# Patient Record
Sex: MALE | Race: WHITE | Employment: FULL TIME | URBAN - METROPOLITAN AREA
[De-identification: names, ages, dates, MRNs, and addresses within clinical notes are randomized per-mention and may not be internally consistent; named-entity substitution may affect disease eponyms.]

---

## 2017-03-22 RX ORDER — TOPIRAMATE 25 MG/1
TABLET ORAL
Qty: 90 TABLET | Refills: 0 | Status: SHIPPED | OUTPATIENT
Start: 2017-03-22 | End: 2017-04-05

## 2017-03-22 RX ORDER — TOPIRAMATE 25 MG/1
TABLET ORAL
Qty: 30 TABLET | Refills: 0 | Status: SHIPPED | OUTPATIENT
Start: 2017-03-22 | End: 2017-03-22

## 2017-03-22 NOTE — TELEPHONE ENCOUNTER
Please call patient. Is he still taking this med? Has he established care with another doctor? If he plans to continue care here, please place Rx for 30 days and patient needs to make appt for fasting labs and then office visit for further refills.

## 2017-03-22 NOTE — TELEPHONE ENCOUNTER
LOV: 12/20/2015   Future office visit: no upcoming visit   Last labs: 5/25/16 Lipid 12/30/15 Cmp   Last RX: 12/22/16 #90 No Refills  Per protocol: Route to provider

## 2017-03-22 NOTE — TELEPHONE ENCOUNTER
Pt notified. Pt stated that he will continue to be a patient here. Pt states that he will call back for the appointment. 30 day refill for pt given.

## 2017-04-05 ENCOUNTER — OFFICE VISIT (OUTPATIENT)
Dept: INTERNAL MEDICINE CLINIC | Facility: CLINIC | Age: 47
End: 2017-04-05

## 2017-04-05 VITALS
DIASTOLIC BLOOD PRESSURE: 66 MMHG | TEMPERATURE: 98 F | RESPIRATION RATE: 16 BRPM | WEIGHT: 188.25 LBS | HEART RATE: 76 BPM | HEIGHT: 72 IN | SYSTOLIC BLOOD PRESSURE: 118 MMHG | OXYGEN SATURATION: 99 % | BODY MASS INDEX: 25.5 KG/M2

## 2017-04-05 DIAGNOSIS — Z00.00 ANNUAL PHYSICAL EXAM: Primary | ICD-10-CM

## 2017-04-05 DIAGNOSIS — Z86.19 H/O COLD SORES: ICD-10-CM

## 2017-04-05 DIAGNOSIS — M76.62 ACHILLES TENDINITIS OF LEFT LOWER EXTREMITY: ICD-10-CM

## 2017-04-05 DIAGNOSIS — E78.5 HYPERLIPIDEMIA, UNSPECIFIED HYPERLIPIDEMIA TYPE: ICD-10-CM

## 2017-04-05 DIAGNOSIS — Z86.69 HISTORY OF MIGRAINE: ICD-10-CM

## 2017-04-05 PROCEDURE — 99213 OFFICE O/P EST LOW 20 MIN: CPT | Performed by: FAMILY MEDICINE

## 2017-04-05 PROCEDURE — 99396 PREV VISIT EST AGE 40-64: CPT | Performed by: FAMILY MEDICINE

## 2017-04-05 RX ORDER — SODIUM FLUORIDE 5 MG/ML
PASTE, DENTIFRICE DENTAL
Refills: 2 | COMMUNITY
Start: 2017-01-12

## 2017-04-05 RX ORDER — VALACYCLOVIR HYDROCHLORIDE 1 G/1
2 TABLET, FILM COATED ORAL EVERY 12 HOURS SCHEDULED
Qty: 20 TABLET | Refills: 1 | Status: SHIPPED | OUTPATIENT
Start: 2017-04-05 | End: 2018-02-20

## 2017-04-05 RX ORDER — TOPIRAMATE 25 MG/1
25 TABLET ORAL
Qty: 90 TABLET | Refills: 3 | Status: SHIPPED | OUTPATIENT
Start: 2017-04-05 | End: 2018-06-29

## 2017-04-06 PROBLEM — Z86.69 HISTORY OF MIGRAINE: Status: ACTIVE | Noted: 2017-04-06

## 2017-04-06 NOTE — PATIENT INSTRUCTIONS
Prevention Guidelines, Men Ages 36 to 52  Screening tests and vaccines are an important part of managing your health. Health counseling is essential, too. Below are guidelines for these, for men ages 36 to 52.  Talk with your healthcare provider to make s Hepatitis A Men at increased risk for infection – talk with your healthcare provider 2 doses given at least 6 months apart   Hepatitis B Men at increased risk for infection – talk with your healthcare provider 3 doses over 6 months; second dose should be g © 3338-7154 [address], [address] [place]. All rights reserved. This information is not intended as a substitute for professional medical care. Always follow your healthcare professional's instructions.

## 2017-04-06 NOTE — PROGRESS NOTES
HPI:    Patient ID: Pedro Ko is a 52year old male. HPI Here for annual check-up. Patient is taking medication for migraines and this controls his migraines- typically doesn't have any if he takes his med daily.  Patient also takes Valtrex as ne Neurological: Negative for dizziness, weakness, light-headedness, numbness and headaches. Hematological: Negative for adenopathy. Does not bruise/bleed easily. Psychiatric/Behavioral: Negative for dysphoric mood. The patient is not nervous/anxious. 2. Reviewed preventive health recommendations with patient. Encouraged regular exercise and healthy eating. 3. Med refill. Controls symptoms, continue as needed. 4. Controlled on med. Continue. 5. Discussed options with patient including PT.  Patient

## 2017-05-13 ENCOUNTER — APPOINTMENT (OUTPATIENT)
Dept: LAB | Age: 47
End: 2017-05-13
Attending: FAMILY MEDICINE
Payer: COMMERCIAL

## 2017-05-13 DIAGNOSIS — E78.5 HYPERLIPIDEMIA, UNSPECIFIED HYPERLIPIDEMIA TYPE: ICD-10-CM

## 2017-05-13 PROCEDURE — 80061 LIPID PANEL: CPT | Performed by: FAMILY MEDICINE

## 2017-05-13 PROCEDURE — 36415 COLL VENOUS BLD VENIPUNCTURE: CPT | Performed by: FAMILY MEDICINE

## 2017-05-13 PROCEDURE — 80053 COMPREHEN METABOLIC PANEL: CPT | Performed by: FAMILY MEDICINE

## 2017-12-05 ENCOUNTER — TELEPHONE (OUTPATIENT)
Dept: INTERNAL MEDICINE CLINIC | Facility: CLINIC | Age: 47
End: 2017-12-05

## 2017-12-08 NOTE — TELEPHONE ENCOUNTER
LMTCB and confirm pt wants to have cpe done sooner than last year-AMS not ordering labs yet too soon-pt insisted to make appt sooner than April and was told to check with insurance if will be covered-pt to call back

## 2017-12-11 NOTE — TELEPHONE ENCOUNTER
Patient verified with ins that he can have a cpe once a calendar year. Please let us know if you will be placing lab orders or if you would like to wait.      Future Appointments  Date Time Provider Beltran Stewart   1/15/2018 10:00 AM Barbara Wei,

## 2017-12-11 NOTE — TELEPHONE ENCOUNTER
Called and spoke w/wife [facility] on HIPPA that pt does not need any labs done prior to appt

## 2018-01-15 ENCOUNTER — OFFICE VISIT (OUTPATIENT)
Dept: INTERNAL MEDICINE CLINIC | Facility: CLINIC | Age: 48
End: 2018-01-15

## 2018-01-15 VITALS
BODY MASS INDEX: 25.73 KG/M2 | HEART RATE: 70 BPM | TEMPERATURE: 99 F | RESPIRATION RATE: 16 BRPM | SYSTOLIC BLOOD PRESSURE: 100 MMHG | WEIGHT: 190 LBS | HEIGHT: 72 IN | DIASTOLIC BLOOD PRESSURE: 60 MMHG

## 2018-01-15 DIAGNOSIS — Z13.1 SCREENING FOR DIABETES MELLITUS: ICD-10-CM

## 2018-01-15 DIAGNOSIS — Z13.0 SCREENING FOR DEFICIENCY ANEMIA: ICD-10-CM

## 2018-01-15 DIAGNOSIS — Z00.00 ANNUAL PHYSICAL EXAM: Primary | ICD-10-CM

## 2018-01-15 DIAGNOSIS — Z13.220 SCREENING, LIPID: ICD-10-CM

## 2018-01-15 DIAGNOSIS — R68.82 DECREASED LIBIDO: ICD-10-CM

## 2018-01-15 DIAGNOSIS — Z30.09 VASECTOMY EVALUATION: ICD-10-CM

## 2018-01-15 PROCEDURE — 99396 PREV VISIT EST AGE 40-64: CPT | Performed by: FAMILY MEDICINE

## 2018-01-15 PROCEDURE — 90471 IMMUNIZATION ADMIN: CPT | Performed by: FAMILY MEDICINE

## 2018-01-15 PROCEDURE — 90686 IIV4 VACC NO PRSV 0.5 ML IM: CPT | Performed by: FAMILY MEDICINE

## 2018-01-15 NOTE — PROGRESS NOTES
HPI:    Patient ID: Deanna Diaz is a 52year old male. HPI Here for annual check-up. Patient notes feeling decreased libido and would like to have testosterone checked.  Brother used testosterone but had side effect of depression and so patient is Psychiatric/Behavioral: Negative for dysphoric mood. The patient is not nervous/anxious.              Current Outpatient Prescriptions:  PREVIDENT 5000 PLUS 1.1 % Dental Cream  Disp:  Rfl: 2   ValACYclovir HCl 1 G Oral Tab Take 2 tablets (2,000 mg total) Pres Free (03502)    Meds This Visit:  No prescriptions requested or ordered in this encounter    Imaging & Referrals:  INFLUENZA VIRUS VACCINE, QUAD, PRESERVATIVE FREE, 0.5 ML  UROLOGY - INTERNAL       RQ#[id number]

## 2018-01-15 NOTE — PATIENT INSTRUCTIONS
Prevention Guidelines, Men Ages 36 to 52  Screening tests and vaccines are an important part of managing your health. Health counseling is essential, too. Below are guidelines for these, for men ages 36 to 52.  Talk with your healthcare provider to make s Chickenpox (varicella) All men in this age group who have no record of this infection or vaccine 2 doses; the second dose should be given at least 4 weeks after the first dose   Hepatitis A Men at increased risk for infection – talk with your healthcare pr Use of tobacco and the health effects it can cause All men in this age group Every exam   [facility]  Date Last Reviewed: 2/1/2017  © 7892-0488 [employer] [address].  [facility]

## 2018-02-03 ENCOUNTER — LAB ENCOUNTER (OUTPATIENT)
Dept: LAB | Age: 48
End: 2018-02-03
Attending: FAMILY MEDICINE
Payer: COMMERCIAL

## 2018-02-03 DIAGNOSIS — Z13.0 SCREENING FOR DEFICIENCY ANEMIA: ICD-10-CM

## 2018-02-03 DIAGNOSIS — Z13.220 SCREENING, LIPID: ICD-10-CM

## 2018-02-03 DIAGNOSIS — R68.82 DECREASED LIBIDO: ICD-10-CM

## 2018-02-03 DIAGNOSIS — Z13.1 SCREENING FOR DIABETES MELLITUS: ICD-10-CM

## 2018-02-03 LAB
ALBUMIN SERPL-MCNC: 4.2 G/DL (ref 3.5–4.8)
ALP LIVER SERPL-CCNC: 67 U/L (ref 45–117)
ALT SERPL-CCNC: 28 U/L (ref 17–63)
AST SERPL-CCNC: 19 U/L (ref 15–41)
BASOPHILS # BLD AUTO: 0.07 X10(3) UL (ref 0–0.1)
BASOPHILS NFR BLD AUTO: 1.2 %
BILIRUB SERPL-MCNC: 0.6 MG/DL (ref 0.1–2)
BUN BLD-MCNC: 17 MG/DL (ref 8–20)
CALCIUM BLD-MCNC: 9 MG/DL (ref 8.3–10.3)
CHLORIDE: 108 MMOL/L (ref 101–111)
CHOLEST SMN-MCNC: 202 MG/DL (ref ?–200)
CO2: 28 MMOL/L (ref 22–32)
CREAT BLD-MCNC: 1.21 MG/DL (ref 0.7–1.3)
EOSINOPHIL # BLD AUTO: 0.08 X10(3) UL (ref 0–0.3)
EOSINOPHIL NFR BLD AUTO: 1.4 %
ERYTHROCYTE [DISTWIDTH] IN BLOOD BY AUTOMATED COUNT: 12.4 % (ref 11.5–16)
GLUCOSE BLD-MCNC: 102 MG/DL (ref 70–99)
HCT VFR BLD AUTO: 45.1 % (ref 37–53)
HDLC SERPL-MCNC: 42 MG/DL (ref 45–?)
HDLC SERPL: 4.81 {RATIO} (ref ?–4.97)
HGB BLD-MCNC: 14.5 G/DL (ref 13–17)
IMMATURE GRANULOCYTE COUNT: 0.01 X10(3) UL (ref 0–1)
IMMATURE GRANULOCYTE RATIO %: 0.2 %
LDLC SERPL CALC-MCNC: 143 MG/DL (ref ?–130)
LYMPHOCYTES # BLD AUTO: 2.22 X10(3) UL (ref 0.9–4)
LYMPHOCYTES NFR BLD AUTO: 38.5 %
M PROTEIN MFR SERPL ELPH: 7.6 G/DL (ref 6.1–8.3)
MCH RBC QN AUTO: 30 PG (ref 27–33.2)
MCHC RBC AUTO-ENTMCNC: 32.2 G/DL (ref 31–37)
MCV RBC AUTO: 93.2 FL (ref 80–99)
MONOCYTES # BLD AUTO: 0.53 X10(3) UL (ref 0.1–0.6)
MONOCYTES NFR BLD AUTO: 9.2 %
NEUTROPHIL ABS PRELIM: 2.85 X10 (3) UL (ref 1.3–6.7)
NEUTROPHILS # BLD AUTO: 2.85 X10(3) UL (ref 1.3–6.7)
NEUTROPHILS NFR BLD AUTO: 49.5 %
NONHDLC SERPL-MCNC: 160 MG/DL (ref ?–130)
PLATELET # BLD AUTO: 252 10(3)UL (ref 150–450)
POTASSIUM SERPL-SCNC: 4.7 MMOL/L (ref 3.6–5.1)
RBC # BLD AUTO: 4.84 X10(6)UL (ref 4.3–5.7)
RED CELL DISTRIBUTION WIDTH-SD: 42.7 FL (ref 35.1–46.3)
SODIUM SERPL-SCNC: 141 MMOL/L (ref 136–144)
TESTOSTERONE: 633.7 NG/DL (ref 241–827)
TRIGL SERPL-MCNC: 84 MG/DL (ref ?–150)
VLDLC SERPL CALC-MCNC: 17 MG/DL (ref 5–40)
WBC # BLD AUTO: 5.8 X10(3) UL (ref 4–13)

## 2018-02-03 PROCEDURE — 85025 COMPLETE CBC W/AUTO DIFF WBC: CPT | Performed by: FAMILY MEDICINE

## 2018-02-03 PROCEDURE — 80061 LIPID PANEL: CPT | Performed by: FAMILY MEDICINE

## 2018-02-03 PROCEDURE — 36415 COLL VENOUS BLD VENIPUNCTURE: CPT | Performed by: FAMILY MEDICINE

## 2018-02-03 PROCEDURE — 80053 COMPREHEN METABOLIC PANEL: CPT | Performed by: FAMILY MEDICINE

## 2018-02-03 PROCEDURE — 84403 ASSAY OF TOTAL TESTOSTERONE: CPT | Performed by: FAMILY MEDICINE

## 2018-02-21 RX ORDER — VALACYCLOVIR HYDROCHLORIDE 1 G/1
TABLET, FILM COATED ORAL
Qty: 20 TABLET | Refills: 0 | Status: SHIPPED | OUTPATIENT
Start: 2018-02-21 | End: 2019-10-04

## 2018-02-21 NOTE — TELEPHONE ENCOUNTER
E request  Medication(s) to Refill:   Pending Prescriptions Disp Refills    VALACYCLOVIR HCL 1 G Oral Tab [Pharmacy Med Name: VALACYCLOVIR 1GM TABLETS] 20 tablet 0     Sig: TAKE 2 TABLETS(2000 MG) BY MOUTH EVERY 12 HOURS           Last Time Medication was

## 2018-06-29 RX ORDER — TOPIRAMATE 25 MG/1
TABLET ORAL
Qty: 90 TABLET | Refills: 0 | Status: SHIPPED | OUTPATIENT
Start: 2018-06-29 | End: 2018-10-04

## 2018-10-04 RX ORDER — TOPIRAMATE 25 MG/1
TABLET ORAL
Qty: 90 TABLET | Refills: 0 | Status: SHIPPED | OUTPATIENT
Start: 2018-10-04 | End: 2019-01-04

## 2018-10-04 NOTE — TELEPHONE ENCOUNTER
LOV-1/15/18  FOV-none  LAST RX-6/29/18 90 tabs 0 refills  LAST LABS-2/3/18  PER PROTOCOL-to providers

## 2018-12-27 ENCOUNTER — TELEPHONE (OUTPATIENT)
Dept: INTERNAL MEDICINE CLINIC | Facility: CLINIC | Age: 48
End: 2018-12-27

## 2018-12-27 DIAGNOSIS — Z00.00 ROUTINE GENERAL MEDICAL EXAMINATION AT A HEALTH CARE FACILITY: Primary | ICD-10-CM

## 2018-12-27 NOTE — TELEPHONE ENCOUNTER
Future Appointments   Date Time Provider Beltran Stewart   1/21/2019 10:30 AM Salinas Dean, DO [facility]     Pt has CPE. Would like BW orders sent to [facility].  Pt aware to fast.

## 2019-01-04 RX ORDER — TOPIRAMATE 25 MG/1
TABLET ORAL
Qty: 90 TABLET | Refills: 0 | Status: SHIPPED | OUTPATIENT
Start: 2019-01-04 | End: 2019-04-07

## 2019-01-04 NOTE — TELEPHONE ENCOUNTER
E request  LOV: 1/15/18- annual px  Last labs: 2/3/18  Last rx: 10/4/18- 90 tablets with 0 refills  Future Appointments   Date Time Provider Beltran Stewart   1/21/2019 10:30 AM Rain Hint, DO [facility]     Per protocol to provider

## 2019-01-21 ENCOUNTER — OFFICE VISIT (OUTPATIENT)
Dept: INTERNAL MEDICINE CLINIC | Facility: CLINIC | Age: 49
End: 2019-01-21
Payer: COMMERCIAL

## 2019-01-21 VITALS
TEMPERATURE: 98 F | HEART RATE: 78 BPM | BODY MASS INDEX: 26 KG/M2 | WEIGHT: 190 LBS | RESPIRATION RATE: 16 BRPM | SYSTOLIC BLOOD PRESSURE: 120 MMHG | DIASTOLIC BLOOD PRESSURE: 70 MMHG

## 2019-01-21 DIAGNOSIS — S86.912A KNEE STRAIN, LEFT, INITIAL ENCOUNTER: ICD-10-CM

## 2019-01-21 DIAGNOSIS — Z86.19 H/O COLD SORES: ICD-10-CM

## 2019-01-21 DIAGNOSIS — Z86.69 HISTORY OF MIGRAINE: ICD-10-CM

## 2019-01-21 DIAGNOSIS — Z00.00 ANNUAL PHYSICAL EXAM: Primary | ICD-10-CM

## 2019-01-21 DIAGNOSIS — E78.5 HYPERLIPIDEMIA, UNSPECIFIED HYPERLIPIDEMIA TYPE: ICD-10-CM

## 2019-01-21 PROCEDURE — 90686 IIV4 VACC NO PRSV 0.5 ML IM: CPT | Performed by: FAMILY MEDICINE

## 2019-01-21 PROCEDURE — 99396 PREV VISIT EST AGE 40-64: CPT | Performed by: FAMILY MEDICINE

## 2019-01-21 PROCEDURE — 90471 IMMUNIZATION ADMIN: CPT | Performed by: FAMILY MEDICINE

## 2019-01-21 NOTE — PROGRESS NOTES
HPI:    Patient ID: Joe Pringle is a 50year old male. HPI Here for annual check-up. Patient notes he hurt his left knee while in bed a couple of weeks ago. Only pain with certain movement in bed.  Exercises regularly and hasn't had pain with exerc VALACYCLOVIR HCL 1 G Oral Tab TAKE 2 TABLETS(2000 MG) BY MOUTH EVERY 12 HOURS Disp: 20 tablet Rfl: 0     Allergies:  Amoxicillin               Sulfa Antibiotics          PHYSICAL EXAM:   Physical Exam   Constitutional: He is oriented to person, place, and

## 2019-02-02 ENCOUNTER — LAB ENCOUNTER (OUTPATIENT)
Dept: LAB | Age: 49
End: 2019-02-02
Attending: FAMILY MEDICINE
Payer: COMMERCIAL

## 2019-02-02 DIAGNOSIS — Z00.00 ROUTINE GENERAL MEDICAL EXAMINATION AT A HEALTH CARE FACILITY: ICD-10-CM

## 2019-02-02 DIAGNOSIS — R73.01 IMPAIRED FASTING GLUCOSE: ICD-10-CM

## 2019-02-02 LAB
ALBUMIN SERPL-MCNC: 4.2 G/DL (ref 3.1–4.5)
ALBUMIN/GLOB SERPL: 1.2 {RATIO} (ref 1–2)
ALP LIVER SERPL-CCNC: 72 U/L (ref 45–117)
ALT SERPL-CCNC: 26 U/L (ref 17–63)
ANION GAP SERPL CALC-SCNC: 3 MMOL/L (ref 0–18)
AST SERPL-CCNC: 20 U/L (ref 15–41)
BASOPHILS # BLD AUTO: 0.05 X10(3) UL (ref 0–0.2)
BASOPHILS NFR BLD AUTO: 0.8 %
BILIRUB SERPL-MCNC: 0.5 MG/DL (ref 0.1–2)
BUN BLD-MCNC: 15 MG/DL (ref 8–20)
BUN/CREAT SERPL: 11.5 (ref 10–20)
CALCIUM BLD-MCNC: 8.8 MG/DL (ref 8.3–10.3)
CHLORIDE SERPL-SCNC: 110 MMOL/L (ref 101–111)
CHOLEST SMN-MCNC: 210 MG/DL (ref ?–200)
CO2 SERPL-SCNC: 29 MMOL/L (ref 22–32)
CREAT BLD-MCNC: 1.3 MG/DL (ref 0.7–1.3)
DEPRECATED RDW RBC AUTO: 43.7 FL (ref 35.1–46.3)
EOSINOPHIL # BLD AUTO: 0.05 X10(3) UL (ref 0–0.7)
EOSINOPHIL NFR BLD AUTO: 0.8 %
ERYTHROCYTE [DISTWIDTH] IN BLOOD BY AUTOMATED COUNT: 12.7 % (ref 11–15)
GLOBULIN PLAS-MCNC: 3.4 G/DL (ref 2.8–4.4)
GLUCOSE BLD-MCNC: 102 MG/DL (ref 70–99)
HCT VFR BLD AUTO: 46.6 % (ref 39–53)
HDLC SERPL-MCNC: 39 MG/DL (ref 40–59)
HGB BLD-MCNC: 15 G/DL (ref 13–17.5)
IMM GRANULOCYTES # BLD AUTO: 0.01 X10(3) UL (ref 0–1)
IMM GRANULOCYTES NFR BLD: 0.2 %
LDLC SERPL CALC-MCNC: 152 MG/DL (ref ?–100)
LYMPHOCYTES # BLD AUTO: 2.28 X10(3) UL (ref 1–4)
LYMPHOCYTES NFR BLD AUTO: 38.2 %
M PROTEIN MFR SERPL ELPH: 7.6 G/DL (ref 6.4–8.2)
MCH RBC QN AUTO: 30.4 PG (ref 26–34)
MCHC RBC AUTO-ENTMCNC: 32.2 G/DL (ref 31–37)
MCV RBC AUTO: 94.5 FL (ref 80–100)
MONOCYTES # BLD AUTO: 0.49 X10(3) UL (ref 0.1–1)
MONOCYTES NFR BLD AUTO: 8.2 %
NEUTROPHILS # BLD AUTO: 3.09 X10 (3) UL (ref 1.5–7.7)
NEUTROPHILS # BLD AUTO: 3.09 X10(3) UL (ref 1.5–7.7)
NEUTROPHILS NFR BLD AUTO: 51.8 %
NONHDLC SERPL-MCNC: 171 MG/DL (ref ?–130)
OSMOLALITY SERPL CALC.SUM OF ELEC: 295 MOSM/KG (ref 275–295)
PLATELET # BLD AUTO: 228 10(3)UL (ref 150–450)
POTASSIUM SERPL-SCNC: 4.5 MMOL/L (ref 3.6–5.1)
RBC # BLD AUTO: 4.93 X10(6)UL (ref 4.3–5.7)
SODIUM SERPL-SCNC: 142 MMOL/L (ref 136–144)
TRIGL SERPL-MCNC: 93 MG/DL (ref 30–149)
VLDLC SERPL CALC-MCNC: 19 MG/DL (ref 0–30)
WBC # BLD AUTO: 6 X10(3) UL (ref 4–11)

## 2019-02-02 PROCEDURE — 80061 LIPID PANEL: CPT | Performed by: FAMILY MEDICINE

## 2019-02-02 PROCEDURE — 85025 COMPLETE CBC W/AUTO DIFF WBC: CPT | Performed by: FAMILY MEDICINE

## 2019-02-02 PROCEDURE — 36415 COLL VENOUS BLD VENIPUNCTURE: CPT | Performed by: FAMILY MEDICINE

## 2019-02-02 PROCEDURE — 80053 COMPREHEN METABOLIC PANEL: CPT | Performed by: FAMILY MEDICINE

## 2019-02-02 PROCEDURE — 83036 HEMOGLOBIN GLYCOSYLATED A1C: CPT | Performed by: FAMILY MEDICINE

## 2019-02-03 DIAGNOSIS — R73.01 IMPAIRED FASTING GLUCOSE: Primary | ICD-10-CM

## 2019-02-03 LAB
EST. AVERAGE GLUCOSE BLD GHB EST-MCNC: 117 MG/DL (ref 68–126)
HBA1C MFR BLD HPLC: 5.7 % (ref ?–5.7)

## 2019-02-06 DIAGNOSIS — Z13.6 SCREENING FOR HEART DISEASE: Primary | ICD-10-CM

## 2019-03-14 ENCOUNTER — HOSPITAL ENCOUNTER (OUTPATIENT)
Dept: CT IMAGING | Facility: HOSPITAL | Age: 49
Discharge: HOME | End: 2019-03-14
Attending: FAMILY MEDICINE

## 2019-03-14 DIAGNOSIS — Z13.6 SCREENING FOR CARDIOVASCULAR CONDITION: ICD-10-CM

## 2019-03-14 DIAGNOSIS — Z13.6 SCREENING FOR HEART DISEASE: ICD-10-CM

## 2019-04-08 RX ORDER — TOPIRAMATE 25 MG/1
TABLET ORAL
Qty: 90 TABLET | Refills: 2 | Status: SHIPPED | OUTPATIENT
Start: 2019-04-08 | End: 2020-01-03

## 2019-04-08 NOTE — TELEPHONE ENCOUNTER
LFV: 1/21/19 with AMS  Future Appt: none on file, due one year  Last Rx:1/4/19 for 90 days  Last Labs: 2/2/19 a1c, lipid, cmp and cbc stable  Per protocol to provider

## 2019-10-04 NOTE — TELEPHONE ENCOUNTER
Last Office Visit 1/21/19    Last CPE 1/15/18    Last refill VALACYCLOVIR HCL 1 G Oral Tab-2/21/18    Quantity-20 tablet, 0 refills    Last labs that are related n/a    Future appointment: none    Protocol: pend    Please approve or deny

## 2019-10-05 RX ORDER — VALACYCLOVIR HYDROCHLORIDE 1 G/1
TABLET, FILM COATED ORAL
Qty: 20 TABLET | Refills: 0 | Status: SHIPPED | OUTPATIENT
Start: 2019-10-05 | End: 2020-01-20

## 2019-11-11 ENCOUNTER — TELEPHONE (OUTPATIENT)
Dept: INTERNAL MEDICINE CLINIC | Facility: CLINIC | Age: 49
End: 2019-11-11

## 2019-11-11 DIAGNOSIS — Z80.0 FAMILY HISTORY OF COLON CANCER: Primary | ICD-10-CM

## 2019-11-11 NOTE — TELEPHONE ENCOUNTER
Pt called stating his younger brother was just diagnosed with colon cancer and he was told to see gastro to get screening colonoscopy-wants to know who to see-please call with info

## 2019-11-11 NOTE — TELEPHONE ENCOUNTER
Spoke with patient provided referral information to [address] [place] [address]. Patient verbalized understanding and agreeable to POC.

## 2019-11-15 ENCOUNTER — OFFICE VISIT (OUTPATIENT)
Dept: INTERNAL MEDICINE CLINIC | Facility: CLINIC | Age: 49
End: 2019-11-15
Payer: COMMERCIAL

## 2019-11-15 ENCOUNTER — TELEPHONE (OUTPATIENT)
Dept: INTERNAL MEDICINE CLINIC | Facility: CLINIC | Age: 49
End: 2019-11-15

## 2019-11-15 VITALS
WEIGHT: 190 LBS | TEMPERATURE: 98 F | HEART RATE: 66 BPM | HEIGHT: 72 IN | DIASTOLIC BLOOD PRESSURE: 64 MMHG | BODY MASS INDEX: 25.73 KG/M2 | SYSTOLIC BLOOD PRESSURE: 110 MMHG | RESPIRATION RATE: 16 BRPM

## 2019-11-15 DIAGNOSIS — B02.9 HERPES ZOSTER WITHOUT COMPLICATION: Primary | ICD-10-CM

## 2019-11-15 DIAGNOSIS — Z13.228 SCREENING FOR METABOLIC DISORDER: ICD-10-CM

## 2019-11-15 DIAGNOSIS — E78.5 HYPERLIPIDEMIA, UNSPECIFIED HYPERLIPIDEMIA TYPE: ICD-10-CM

## 2019-11-15 DIAGNOSIS — Z00.00 ROUTINE GENERAL MEDICAL EXAMINATION AT A HEALTH CARE FACILITY: Primary | ICD-10-CM

## 2019-11-15 DIAGNOSIS — Z13.0 SCREENING FOR BLOOD DISEASE: ICD-10-CM

## 2019-11-15 PROCEDURE — 99214 OFFICE O/P EST MOD 30 MIN: CPT | Performed by: FAMILY MEDICINE

## 2019-11-15 RX ORDER — VALACYCLOVIR HYDROCHLORIDE 1 G/1
1 TABLET, FILM COATED ORAL 3 TIMES DAILY
Qty: 21 TABLET | Refills: 0 | Status: SHIPPED | OUTPATIENT
Start: 2019-11-15 | End: 2021-01-18

## 2019-11-15 NOTE — TELEPHONE ENCOUNTER
Future Appointments   Date Time Provider Beltran Stewart   1/20/2020 10:00 AM Sarika Oconnell,  [facility]     Orders to edward- Pt aware to fast-no call back required

## 2019-11-15 NOTE — PROGRESS NOTES
HPI:    Patient ID: Ani Solitario is a 52year old male. HPI Here with c/o rash on neck that started 2 days ago. Patient notes pain and sensitivity with with rash. No fever. Hasn't tried anything for this.      Past Medical History:   Diagnosis Date Requested Prescriptions     Signed Prescriptions Disp Refills   • valACYclovir HCl 1 G Oral Tab 21 tablet 0     Sig: Take 1 tablet (1,000 mg total) by mouth 3 (three) times daily for 7 doses.        Imaging & Referrals:  None       RN#[id number]

## 2020-01-03 RX ORDER — TOPIRAMATE 25 MG/1
TABLET ORAL
Qty: 90 TABLET | Refills: 0 | Status: SHIPPED | OUTPATIENT
Start: 2020-01-03 | End: 2020-04-06

## 2020-01-03 NOTE — TELEPHONE ENCOUNTER
LOV:11/15/19-Shingles  Last CPE:1/21/19  Last refill:TOPIRAMATE 25 MG Oral Tab-4/8/19   Quantity: 90 tablet, 2 refills  Last labs that are related: cbc 2/2/19  Future appointment:  Future Appointments   Date Time Provider Beltran Stewart   1/13/2020 10:1

## 2020-01-13 PROBLEM — Z80.0 FAMILY HISTORY OF MALIGNANT NEOPLASM OF DIGESTIVE ORGAN: Status: ACTIVE | Noted: 2020-01-13

## 2020-01-13 PROBLEM — Z12.11 SPECIAL SCREENING FOR MALIGNANT NEOPLASM OF COLON: Status: ACTIVE | Noted: 2020-01-13

## 2020-01-18 ENCOUNTER — LAB ENCOUNTER (OUTPATIENT)
Dept: LAB | Age: 50
End: 2020-01-18
Attending: FAMILY MEDICINE
Payer: COMMERCIAL

## 2020-01-18 DIAGNOSIS — Z13.228 SCREENING FOR METABOLIC DISORDER: ICD-10-CM

## 2020-01-18 DIAGNOSIS — E78.5 HYPERLIPIDEMIA, UNSPECIFIED HYPERLIPIDEMIA TYPE: ICD-10-CM

## 2020-01-18 DIAGNOSIS — R73.01 IMPAIRED FASTING GLUCOSE: ICD-10-CM

## 2020-01-18 DIAGNOSIS — Z13.0 SCREENING FOR BLOOD DISEASE: ICD-10-CM

## 2020-01-18 DIAGNOSIS — Z00.00 ROUTINE GENERAL MEDICAL EXAMINATION AT A HEALTH CARE FACILITY: ICD-10-CM

## 2020-01-18 LAB
ALBUMIN SERPL-MCNC: 4.1 G/DL (ref 3.4–5)
ALBUMIN/GLOB SERPL: 1.1 {RATIO} (ref 1–2)
ALP LIVER SERPL-CCNC: 68 U/L (ref 45–117)
ALT SERPL-CCNC: 30 U/L (ref 16–61)
ANION GAP SERPL CALC-SCNC: 4 MMOL/L (ref 0–18)
AST SERPL-CCNC: 19 U/L (ref 15–37)
BASOPHILS # BLD AUTO: 0.09 X10(3) UL (ref 0–0.2)
BASOPHILS NFR BLD AUTO: 1.4 %
BILIRUB SERPL-MCNC: 0.5 MG/DL (ref 0.1–2)
BUN BLD-MCNC: 19 MG/DL (ref 7–18)
BUN/CREAT SERPL: 14.7 (ref 10–20)
CALCIUM BLD-MCNC: 8.9 MG/DL (ref 8.5–10.1)
CHLORIDE SERPL-SCNC: 111 MMOL/L (ref 98–112)
CHOLEST SMN-MCNC: 220 MG/DL (ref ?–200)
CO2 SERPL-SCNC: 27 MMOL/L (ref 21–32)
CREAT BLD-MCNC: 1.29 MG/DL (ref 0.7–1.3)
DEPRECATED RDW RBC AUTO: 43.8 FL (ref 35.1–46.3)
EOSINOPHIL # BLD AUTO: 0.1 X10(3) UL (ref 0–0.7)
EOSINOPHIL NFR BLD AUTO: 1.6 %
ERYTHROCYTE [DISTWIDTH] IN BLOOD BY AUTOMATED COUNT: 12.7 % (ref 11–15)
GLOBULIN PLAS-MCNC: 3.6 G/DL (ref 2.8–4.4)
GLUCOSE BLD-MCNC: 101 MG/DL (ref 70–99)
HCT VFR BLD AUTO: 46.7 % (ref 39–53)
HDLC SERPL-MCNC: 40 MG/DL (ref 40–59)
HGB BLD-MCNC: 15 G/DL (ref 13–17.5)
IMM GRANULOCYTES # BLD AUTO: 0.01 X10(3) UL (ref 0–1)
IMM GRANULOCYTES NFR BLD: 0.2 %
LDLC SERPL CALC-MCNC: 158 MG/DL (ref ?–100)
LYMPHOCYTES # BLD AUTO: 2.47 X10(3) UL (ref 1–4)
LYMPHOCYTES NFR BLD AUTO: 38.8 %
M PROTEIN MFR SERPL ELPH: 7.7 G/DL (ref 6.4–8.2)
MCH RBC QN AUTO: 30.4 PG (ref 26–34)
MCHC RBC AUTO-ENTMCNC: 32.1 G/DL (ref 31–37)
MCV RBC AUTO: 94.5 FL (ref 80–100)
MONOCYTES # BLD AUTO: 0.55 X10(3) UL (ref 0.1–1)
MONOCYTES NFR BLD AUTO: 8.6 %
NEUTROPHILS # BLD AUTO: 3.14 X10 (3) UL (ref 1.5–7.7)
NEUTROPHILS # BLD AUTO: 3.14 X10(3) UL (ref 1.5–7.7)
NEUTROPHILS NFR BLD AUTO: 49.4 %
NONHDLC SERPL-MCNC: 180 MG/DL (ref ?–130)
OSMOLALITY SERPL CALC.SUM OF ELEC: 296 MOSM/KG (ref 275–295)
PATIENT FASTING Y/N/NP: YES
PATIENT FASTING Y/N/NP: YES
PLATELET # BLD AUTO: 246 10(3)UL (ref 150–450)
POTASSIUM SERPL-SCNC: 4.3 MMOL/L (ref 3.5–5.1)
RBC # BLD AUTO: 4.94 X10(6)UL (ref 4.3–5.7)
SODIUM SERPL-SCNC: 142 MMOL/L (ref 136–145)
TRIGL SERPL-MCNC: 108 MG/DL (ref 30–149)
VLDLC SERPL CALC-MCNC: 22 MG/DL (ref 0–30)
WBC # BLD AUTO: 6.4 X10(3) UL (ref 4–11)

## 2020-01-18 PROCEDURE — 80061 LIPID PANEL: CPT | Performed by: FAMILY MEDICINE

## 2020-01-18 PROCEDURE — 83036 HEMOGLOBIN GLYCOSYLATED A1C: CPT | Performed by: FAMILY MEDICINE

## 2020-01-18 PROCEDURE — 85025 COMPLETE CBC W/AUTO DIFF WBC: CPT | Performed by: FAMILY MEDICINE

## 2020-01-18 PROCEDURE — 80053 COMPREHEN METABOLIC PANEL: CPT | Performed by: FAMILY MEDICINE

## 2020-01-18 PROCEDURE — 36415 COLL VENOUS BLD VENIPUNCTURE: CPT | Performed by: FAMILY MEDICINE

## 2020-01-20 ENCOUNTER — OFFICE VISIT (OUTPATIENT)
Dept: INTERNAL MEDICINE CLINIC | Facility: CLINIC | Age: 50
End: 2020-01-20
Payer: COMMERCIAL

## 2020-01-20 VITALS
HEART RATE: 76 BPM | HEIGHT: 72 IN | DIASTOLIC BLOOD PRESSURE: 60 MMHG | RESPIRATION RATE: 16 BRPM | WEIGHT: 191 LBS | SYSTOLIC BLOOD PRESSURE: 118 MMHG | TEMPERATURE: 98 F | BODY MASS INDEX: 25.87 KG/M2

## 2020-01-20 DIAGNOSIS — E78.5 HYPERLIPIDEMIA, UNSPECIFIED HYPERLIPIDEMIA TYPE: ICD-10-CM

## 2020-01-20 DIAGNOSIS — R73.01 IMPAIRED FASTING GLUCOSE: ICD-10-CM

## 2020-01-20 DIAGNOSIS — Z00.00 ANNUAL PHYSICAL EXAM: Primary | ICD-10-CM

## 2020-01-20 LAB
EST. AVERAGE GLUCOSE BLD GHB EST-MCNC: 114 MG/DL (ref 68–126)
HBA1C MFR BLD HPLC: 5.6 % (ref ?–5.7)

## 2020-01-20 PROCEDURE — 99396 PREV VISIT EST AGE 40-64: CPT | Performed by: FAMILY MEDICINE

## 2020-01-20 NOTE — PROGRESS NOTES
HPI:    Patient ID: Chris Maldonado is a 52year old male. HPI Here for annual check-up. Patient has no complaints. Exercises 5-6 days per week and does cardio and weights. Tries to eat healthy.      Past Medical History:   Diagnosis Date   • Bloating mood. The patient is not nervous/anxious.              Current Outpatient Medications   Medication Sig Dispense Refill   • TOPIRAMATE 25 MG Oral Tab TAKE 1 TABLET BY MOUTH EVERY DAY 90 tablet 0   • PREVIDENT 5000 PLUS 1.1 % Dental Cream   2     Allergies:

## 2020-04-06 RX ORDER — TOPIRAMATE 25 MG/1
TABLET ORAL
Qty: 90 TABLET | Refills: 1 | Status: SHIPPED | OUTPATIENT
Start: 2020-04-06 | End: 2020-10-23

## 2020-04-06 NOTE — TELEPHONE ENCOUNTER
Last Ov: 1/20/20, AMS, CPE  Upcoming appt: no upcoming appt  Last labs: A1c, Lipid, CMP, CBC 1/18/20  Last Rx: topiramate 25mg, #90, 0R 1/3/20

## 2020-07-30 NOTE — TELEPHONE ENCOUNTER
----- Message from Chris Machado sent at 7/30/2020 11:52 AM EDT -----  Regarding: Dr Dior [facility]: [phone]  Mom is calling back and would like to talk to the doctor only when he gets back to his office. Please advise. Patient was sent a #30 however per pharmacy has to state #90.

## 2020-10-23 RX ORDER — TOPIRAMATE 25 MG/1
TABLET ORAL
Qty: 90 TABLET | Refills: 0 | Status: SHIPPED | OUTPATIENT
Start: 2020-10-23 | End: 2021-01-19

## 2020-10-23 NOTE — TELEPHONE ENCOUNTER
LOV:1/20/20, CPE, AMS  Last CPE:1/20/20, CPE, AMS  Last refill:TOPIRAMATE 25 MG Oral Tab-4/6/20  Quantity:90 tablet, 1 refill  Last labs that are related: cbc, cmp, lipid 1/18/20  Future appointment:No future appointments.   Protocol:pend for provider    Pl

## 2020-10-24 ENCOUNTER — TELEPHONE (OUTPATIENT)
Dept: INTERNAL MEDICINE CLINIC | Facility: CLINIC | Age: 50
End: 2020-10-24

## 2020-10-24 DIAGNOSIS — Z13.228 SCREENING FOR METABOLIC DISORDER: ICD-10-CM

## 2020-10-24 DIAGNOSIS — Z00.00 ANNUAL PHYSICAL EXAM: Primary | ICD-10-CM

## 2020-10-24 DIAGNOSIS — Z12.5 SCREENING FOR PROSTATE CANCER: ICD-10-CM

## 2020-10-24 DIAGNOSIS — R73.01 IMPAIRED FASTING GLUCOSE: ICD-10-CM

## 2020-10-24 DIAGNOSIS — Z13.0 SCREENING FOR DISORDER OF BLOOD AND BLOOD-FORMING ORGANS: ICD-10-CM

## 2020-10-24 DIAGNOSIS — Z13.0 SCREENING FOR BLOOD DISEASE: ICD-10-CM

## 2020-10-24 NOTE — TELEPHONE ENCOUNTER
Future Appointments   Date Time Provider Beltran Stewart   1/18/2021  9:00 AM Mamadou Hernandez, DO [facility]     Annual Physical   Lab is [facility]  Pt aware to fast and to complete labs no sooner than 2 weeks prior to physical   No call back requ

## 2020-12-16 NOTE — TELEPHONE ENCOUNTER
Pending labs for [facility]    Future Appointments   Date Time Provider Beltran Stewart   1/11/2021  7:30 AM REF BK RD REF [id number] Ref Book 14   1/18/2021  9:00 AM Salinas Dean DO [facility]

## 2021-01-11 ENCOUNTER — LAB ENCOUNTER (OUTPATIENT)
Dept: LAB | Age: 51
End: 2021-01-11
Attending: FAMILY MEDICINE
Payer: COMMERCIAL

## 2021-01-11 DIAGNOSIS — Z13.0 SCREENING FOR DISORDER OF BLOOD AND BLOOD-FORMING ORGANS: ICD-10-CM

## 2021-01-11 DIAGNOSIS — Z13.228 SCREENING FOR METABOLIC DISORDER: ICD-10-CM

## 2021-01-11 DIAGNOSIS — R73.01 IMPAIRED FASTING GLUCOSE: ICD-10-CM

## 2021-01-11 DIAGNOSIS — Z00.00 ANNUAL PHYSICAL EXAM: ICD-10-CM

## 2021-01-11 LAB
ALBUMIN SERPL-MCNC: 4.1 G/DL (ref 3.4–5)
ALBUMIN/GLOB SERPL: 1.2 {RATIO} (ref 1–2)
ALP LIVER SERPL-CCNC: 70 U/L
ALT SERPL-CCNC: 32 U/L
ANION GAP SERPL CALC-SCNC: 4 MMOL/L (ref 0–18)
AST SERPL-CCNC: 18 U/L (ref 15–37)
BASOPHILS # BLD AUTO: 0.06 X10(3) UL (ref 0–0.2)
BASOPHILS NFR BLD AUTO: 0.9 %
BILIRUB SERPL-MCNC: 0.4 MG/DL (ref 0.1–2)
BUN BLD-MCNC: 18 MG/DL (ref 7–18)
BUN/CREAT SERPL: 13.4 (ref 10–20)
CALCIUM BLD-MCNC: 9.5 MG/DL (ref 8.5–10.1)
CHLORIDE SERPL-SCNC: 109 MMOL/L (ref 98–112)
CHOLEST SMN-MCNC: 231 MG/DL (ref ?–200)
CO2 SERPL-SCNC: 26 MMOL/L (ref 21–32)
CREAT BLD-MCNC: 1.34 MG/DL
DEPRECATED RDW RBC AUTO: 42.4 FL (ref 35.1–46.3)
EOSINOPHIL # BLD AUTO: 0.1 X10(3) UL (ref 0–0.7)
EOSINOPHIL NFR BLD AUTO: 1.6 %
ERYTHROCYTE [DISTWIDTH] IN BLOOD BY AUTOMATED COUNT: 12.5 % (ref 11–15)
EST. AVERAGE GLUCOSE BLD GHB EST-MCNC: 120 MG/DL (ref 68–126)
GLOBULIN PLAS-MCNC: 3.4 G/DL (ref 2.8–4.4)
GLUCOSE BLD-MCNC: 98 MG/DL (ref 70–99)
HBA1C MFR BLD HPLC: 5.8 % (ref ?–5.7)
HCT VFR BLD AUTO: 47 %
HDLC SERPL-MCNC: 41 MG/DL (ref 40–59)
HGB BLD-MCNC: 15.2 G/DL
IMM GRANULOCYTES # BLD AUTO: 0.01 X10(3) UL (ref 0–1)
IMM GRANULOCYTES NFR BLD: 0.2 %
LDLC SERPL CALC-MCNC: 160 MG/DL (ref ?–100)
LYMPHOCYTES # BLD AUTO: 2.4 X10(3) UL (ref 1–4)
LYMPHOCYTES NFR BLD AUTO: 37.3 %
M PROTEIN MFR SERPL ELPH: 7.5 G/DL (ref 6.4–8.2)
MCH RBC QN AUTO: 30.2 PG (ref 26–34)
MCHC RBC AUTO-ENTMCNC: 32.3 G/DL (ref 31–37)
MCV RBC AUTO: 93.3 FL
MONOCYTES # BLD AUTO: 0.59 X10(3) UL (ref 0.1–1)
MONOCYTES NFR BLD AUTO: 9.2 %
NEUTROPHILS # BLD AUTO: 3.27 X10 (3) UL (ref 1.5–7.7)
NEUTROPHILS # BLD AUTO: 3.27 X10(3) UL (ref 1.5–7.7)
NEUTROPHILS NFR BLD AUTO: 50.8 %
NONHDLC SERPL-MCNC: 190 MG/DL (ref ?–130)
OSMOLALITY SERPL CALC.SUM OF ELEC: 290 MOSM/KG (ref 275–295)
PATIENT FASTING Y/N/NP: YES
PATIENT FASTING Y/N/NP: YES
PLATELET # BLD AUTO: 235 10(3)UL (ref 150–450)
POTASSIUM SERPL-SCNC: 4.1 MMOL/L (ref 3.5–5.1)
RBC # BLD AUTO: 5.04 X10(6)UL
SODIUM SERPL-SCNC: 139 MMOL/L (ref 136–145)
TRIGL SERPL-MCNC: 151 MG/DL (ref 30–149)
VLDLC SERPL CALC-MCNC: 30 MG/DL (ref 0–30)
WBC # BLD AUTO: 6.4 X10(3) UL (ref 4–11)

## 2021-01-11 PROCEDURE — 80061 LIPID PANEL: CPT

## 2021-01-11 PROCEDURE — 80053 COMPREHEN METABOLIC PANEL: CPT

## 2021-01-11 PROCEDURE — 85025 COMPLETE CBC W/AUTO DIFF WBC: CPT

## 2021-01-11 PROCEDURE — 36415 COLL VENOUS BLD VENIPUNCTURE: CPT

## 2021-01-11 PROCEDURE — 83036 HEMOGLOBIN GLYCOSYLATED A1C: CPT

## 2021-01-18 ENCOUNTER — OFFICE VISIT (OUTPATIENT)
Dept: INTERNAL MEDICINE CLINIC | Facility: CLINIC | Age: 51
End: 2021-01-18
Payer: COMMERCIAL

## 2021-01-18 VITALS
WEIGHT: 200 LBS | HEART RATE: 73 BPM | HEIGHT: 72.24 IN | RESPIRATION RATE: 16 BRPM | SYSTOLIC BLOOD PRESSURE: 100 MMHG | TEMPERATURE: 98 F | BODY MASS INDEX: 27.09 KG/M2 | DIASTOLIC BLOOD PRESSURE: 60 MMHG

## 2021-01-18 DIAGNOSIS — E78.5 HYPERLIPIDEMIA, UNSPECIFIED HYPERLIPIDEMIA TYPE: ICD-10-CM

## 2021-01-18 DIAGNOSIS — Z12.5 SCREENING PSA (PROSTATE SPECIFIC ANTIGEN): ICD-10-CM

## 2021-01-18 DIAGNOSIS — S46.912A STRAIN OF LEFT SHOULDER, INITIAL ENCOUNTER: ICD-10-CM

## 2021-01-18 DIAGNOSIS — Z00.00 ANNUAL PHYSICAL EXAM: Primary | ICD-10-CM

## 2021-01-18 PROCEDURE — 3074F SYST BP LT 130 MM HG: CPT | Performed by: FAMILY MEDICINE

## 2021-01-18 PROCEDURE — 90750 HZV VACC RECOMBINANT IM: CPT | Performed by: FAMILY MEDICINE

## 2021-01-18 PROCEDURE — 3078F DIAST BP <80 MM HG: CPT | Performed by: FAMILY MEDICINE

## 2021-01-18 PROCEDURE — 90471 IMMUNIZATION ADMIN: CPT | Performed by: FAMILY MEDICINE

## 2021-01-18 PROCEDURE — 3008F BODY MASS INDEX DOCD: CPT | Performed by: FAMILY MEDICINE

## 2021-01-18 PROCEDURE — 99396 PREV VISIT EST AGE 40-64: CPT | Performed by: FAMILY MEDICINE

## 2021-01-18 RX ORDER — VALACYCLOVIR HYDROCHLORIDE 1 G/1
1 TABLET, FILM COATED ORAL 3 TIMES DAILY
Qty: 21 TABLET | Refills: 0 | Status: SHIPPED | OUTPATIENT
Start: 2021-01-18 | End: 2022-01-18

## 2021-01-18 NOTE — PROGRESS NOTES
HPI:    Patient ID: Jenni Wen is a 48year old male. HPI Here for annual check-up. Patient has been exercising at home during pandemic but does note he isn't moving as much since he's not commuting. In general, eats healthy.    Did something to h light-headedness, numbness and headaches. Hematological: Negative for adenopathy. Does not bruise/bleed easily. Psychiatric/Behavioral: Negative for dysphoric mood. The patient is not nervous/anxious.              Current Outpatient Medications   Medica reduce LDL. 4. Offered PT and patient will consider and call if wanting order.      Orders Placed This Encounter      PSA (Screening) [E]      Zoster Recombinant Adjuvanted [Shingrix -Shingles] (30595)      Zoster Recombinant Adjuvanted [Shingrix -Shingle

## 2021-01-19 RX ORDER — TOPIRAMATE 25 MG/1
TABLET ORAL
Qty: 90 TABLET | Refills: 3 | Status: SHIPPED | OUTPATIENT
Start: 2021-01-19

## 2021-01-19 NOTE — TELEPHONE ENCOUNTER
Last visit-  01/18/2021 cpe    Last refill- 10/23/2020 topiramate 25mg QTY90 0R    Last labs-  01/11/2021 cbc, cmp, hemoglobin a1c, lipid    No future appointments.

## 2021-02-19 ENCOUNTER — LAB ENCOUNTER (OUTPATIENT)
Dept: LAB | Age: 51
End: 2021-02-19
Attending: FAMILY MEDICINE
Payer: COMMERCIAL

## 2021-02-19 DIAGNOSIS — Z12.5 SCREENING PSA (PROSTATE SPECIFIC ANTIGEN): ICD-10-CM

## 2021-02-19 LAB — COMPLEXED PSA SERPL-MCNC: 1.46 NG/ML (ref ?–4)

## 2021-02-19 PROCEDURE — 84153 ASSAY OF PSA TOTAL: CPT | Performed by: FAMILY MEDICINE

## 2021-02-19 PROCEDURE — 36415 COLL VENOUS BLD VENIPUNCTURE: CPT | Performed by: FAMILY MEDICINE

## 2021-03-03 ENCOUNTER — TELEPHONE (OUTPATIENT)
Dept: INTERNAL MEDICINE CLINIC | Facility: CLINIC | Age: 51
End: 2021-03-03

## 2021-03-03 DIAGNOSIS — Z23 NEED FOR SHINGLES VACCINE: Primary | ICD-10-CM

## 2021-03-03 NOTE — TELEPHONE ENCOUNTER
Please place orders for second shingrix  Future Appointments   Date Time Provider Beltran Stewart   3/19/2021  8:30 AM [facility]

## 2021-03-03 NOTE — TELEPHONE ENCOUNTER
Zoster Vaccine Recombinant Adjuvanted (Shingrix) 1/18/2021     Order pended for review and approval if ok please advise.

## 2021-03-19 ENCOUNTER — NURSE ONLY (OUTPATIENT)
Dept: INTERNAL MEDICINE CLINIC | Facility: CLINIC | Age: 51
End: 2021-03-19
Payer: COMMERCIAL

## 2021-03-19 PROCEDURE — 90750 HZV VACC RECOMBINANT IM: CPT | Performed by: FAMILY MEDICINE

## 2021-03-19 PROCEDURE — 90471 IMMUNIZATION ADMIN: CPT | Performed by: FAMILY MEDICINE

## 2021-04-30 ENCOUNTER — TELEPHONE (OUTPATIENT)
Dept: INTERNAL MEDICINE CLINIC | Facility: CLINIC | Age: 51
End: 2021-04-30

## 2021-04-30 DIAGNOSIS — S46.912A STRAIN OF LEFT SHOULDER, INITIAL ENCOUNTER: Primary | ICD-10-CM

## 2021-04-30 NOTE — TELEPHONE ENCOUNTER
LOV with AMS 1/18/2021, ok for PT? ASSESSMENT/PLAN:   Annual physical exam  (primary encounter diagnosis)  Screening psa (prostate specific antigen)  Hyperlipidemia, unspecified hyperlipidemia type  Strain of left shoulder, initial encounter  1.  Reviewe

## 2021-04-30 NOTE — TELEPHONE ENCOUNTER
Pt stated when he was here last with AMS he mentioned he has pain in his left arm and shoulder and AMS stated she would recommend a PT. Pt stated he's still having pain and would like a recommendation for a Pt Please advise.

## 2021-05-24 ENCOUNTER — OFFICE VISIT (OUTPATIENT)
Dept: PHYSICAL THERAPY | Age: 51
End: 2021-05-24
Attending: FAMILY MEDICINE
Payer: COMMERCIAL

## 2021-05-24 DIAGNOSIS — S46.912A STRAIN OF LEFT SHOULDER, INITIAL ENCOUNTER: ICD-10-CM

## 2021-05-24 PROCEDURE — 97161 PT EVAL LOW COMPLEX 20 MIN: CPT

## 2021-05-24 PROCEDURE — 97110 THERAPEUTIC EXERCISES: CPT

## 2021-05-24 NOTE — PROGRESS NOTES
SHOULDER EVALUATION:   Referring Physician: Dr. Luly Galvez  Diagnosis: L shoulder adhesive capsulitis with possible underlying strain     Date of Service: 5/24/2021     PATIENT SUMMARY   Chaparro Cui is a 46year old male who presents to therapy today it closed. He states it just opened back up and he has been going back to the gym. He has tried to workout at home but it is not the same.    Typical routine at the gym: before the pandemic- cardio 3x/week, personal training 1x/weeek and then doing that in underlying strain. TB reassessed as mobility improves. Pt and PT discussed evaluation findings, pathology, POC and HEP. Pt voiced understanding and performs HEP correctly without reported pain.  Skilled Physical Therapy is medically necessary to address th 05/24/2021Prepared by: Beba Uribe   Shoulder Flexion Wall Slide with Towel - 3 x daily - 7 x weekly - 1 sets - 10 reps   Standing Shoulder Internal Rotation Stretch with Hands Behind Back - 3 x daily - 7 x weekly - 1 sets - 10 reps   Supine Ch treatment options and has agreed to actively participate in planning and for this course of care. Thank you for your referral. Please co-sign or sign and return this letter via fax as soon as possible to [phone].  If you have any questions, please c

## 2021-05-26 ENCOUNTER — OFFICE VISIT (OUTPATIENT)
Dept: PHYSICAL THERAPY | Age: 51
End: 2021-05-26
Attending: FAMILY MEDICINE
Payer: COMMERCIAL

## 2021-05-26 PROCEDURE — 97140 MANUAL THERAPY 1/> REGIONS: CPT

## 2021-05-26 PROCEDURE — 97110 THERAPEUTIC EXERCISES: CPT

## 2021-05-26 NOTE — PROGRESS NOTES
Dx:  L shoulder adhesive capsulitis with possible underlying strain           Insurance (Authorized # of Visits):  Estela Coleman Physician: Dr. Santos Forbes  Next MD visit: none scheduled  Fall Risk: standard         Precautions: n/a HEP to maintain progress achieved in PT       Plan: Continue per plan of care.    Plan for next therapy session: STM to posterior shoulder RC   Date: 5/26/2021  TX#: 2/8 Date:                 TX#: 3/ Date:                 TX#: 4/ Date:                 TX#:

## 2021-06-02 ENCOUNTER — OFFICE VISIT (OUTPATIENT)
Dept: PHYSICAL THERAPY | Age: 51
End: 2021-06-02
Attending: FAMILY MEDICINE
Payer: COMMERCIAL

## 2021-06-02 PROCEDURE — 97110 THERAPEUTIC EXERCISES: CPT

## 2021-06-02 PROCEDURE — 97140 MANUAL THERAPY 1/> REGIONS: CPT

## 2021-06-02 NOTE — PROGRESS NOTES
Dx:  L shoulder adhesive capsulitis with possible underlying strain           Insurance (Authorized # of Visits):  Vilma Gong Physician: Dr. Matt Hartmann  Next MD visit: none scheduled  Fall Risk: standard         Precautions: n/a posterior capsule stretch, progress as necessary, work strength/endurance to unremarkable   Date: 5/26/2021  TX#: 2/8 Date: 6/2/2021                TX#: 3/8 Date:                 TX#: 4/ Date:                 TX#: 5/ Date:    Tx#: 6/   Warm up: [employer] Pt education: HEP update       HEP:   Access Code: [email or web address]  URL: [email or web address]. [employer]/  Date: 05/26/2021  Prepared by: Demian Valladares    Program Notes  Ice after the routine if pain lingers more than an hour.  If ice does not resolve pain soone

## 2021-06-14 ENCOUNTER — APPOINTMENT (OUTPATIENT)
Dept: PHYSICAL THERAPY | Age: 51
End: 2021-06-14
Attending: FAMILY MEDICINE
Payer: COMMERCIAL

## 2021-06-15 ENCOUNTER — APPOINTMENT (OUTPATIENT)
Dept: PHYSICAL THERAPY | Age: 51
End: 2021-06-15
Payer: COMMERCIAL

## 2021-06-21 ENCOUNTER — APPOINTMENT (OUTPATIENT)
Dept: PHYSICAL THERAPY | Age: 51
End: 2021-06-21
Attending: FAMILY MEDICINE
Payer: COMMERCIAL

## 2021-06-23 ENCOUNTER — APPOINTMENT (OUTPATIENT)
Dept: PHYSICAL THERAPY | Age: 51
End: 2021-06-23
Attending: FAMILY MEDICINE
Payer: COMMERCIAL

## 2021-06-23 ENCOUNTER — OFFICE VISIT (OUTPATIENT)
Dept: PHYSICAL THERAPY | Age: 51
End: 2021-06-23
Attending: FAMILY MEDICINE
Payer: COMMERCIAL

## 2021-06-23 PROCEDURE — 97140 MANUAL THERAPY 1/> REGIONS: CPT

## 2021-06-23 PROCEDURE — 97110 THERAPEUTIC EXERCISES: CPT

## 2021-06-23 NOTE — PROGRESS NOTES
Dx:  L shoulder adhesive capsulitis with possible underlying strain           Insurance (Authorized # of Visits):  Guillermina Johnson Physician: Dr. Shelby Wisdom  Next MD visit: none scheduled  Fall Risk: standard         Precautions: n/a unremarkable for pain    Wall slides 10 reps   BHB stretch- deferred for unremarkable   Bent over propped shoulder Horizontal abduction 10 reps x 2 sets - with scapular retraction  There ex:   Sidelying scapular retraction on the L 10 reps   Sidelying ER n session: assess tolerance to posterior capsule stretch, progress as necessary, work strength/endurance to unremarkable         Charges:  There ex: 2; manual: 1       Total Timed Treatment: 45 min  Total Treatment Time: 45 min

## 2021-06-28 ENCOUNTER — APPOINTMENT (OUTPATIENT)
Dept: PHYSICAL THERAPY | Age: 51
End: 2021-06-28
Attending: FAMILY MEDICINE
Payer: COMMERCIAL

## 2021-06-28 ENCOUNTER — OFFICE VISIT (OUTPATIENT)
Dept: PHYSICAL THERAPY | Age: 51
End: 2021-06-28
Attending: FAMILY MEDICINE
Payer: COMMERCIAL

## 2021-06-28 PROCEDURE — 97110 THERAPEUTIC EXERCISES: CPT

## 2021-06-28 PROCEDURE — 97140 MANUAL THERAPY 1/> REGIONS: CPT

## 2021-06-28 NOTE — PROGRESS NOTES
Dx:  L shoulder adhesive capsulitis with possible underlying strain           Insurance (Authorized # of Visits):  Sterling Myers Physician: Dr. Diana Lawler MD visit: none scheduled  Fall Risk: standard         Precautions: n/a unremarkable for pain    Wall slides 10 reps   BHB stretch- deferred for unremarkable   Bent over propped shoulder Horizontal abduction 10 reps x 2 sets - with scapular retraction  There ex:   Sidelying scapular retraction on the L 10 reps   Sidelying ER n weights for indep gym routine   · Pt will be independent and compliant with comprehensive HEP to maintain progress achieved in PT       Plan: Continue per plan of care.    Plan for next therapy session: assess tolerance to posterior capsule stretch, progres

## 2021-06-30 ENCOUNTER — OFFICE VISIT (OUTPATIENT)
Dept: PHYSICAL THERAPY | Age: 51
End: 2021-06-30
Attending: FAMILY MEDICINE
Payer: COMMERCIAL

## 2021-06-30 PROCEDURE — 97110 THERAPEUTIC EXERCISES: CPT

## 2021-06-30 PROCEDURE — 97140 MANUAL THERAPY 1/> REGIONS: CPT

## 2021-06-30 NOTE — PROGRESS NOTES
Dx:  L shoulder adhesive capsulitis with possible underlying strain           Insurance (Authorized # of Visits):  Karel Verdin Physician: Dr. Therman Collet  Next MD visit: none scheduled  Fall Risk: standard         Precautions: n/a Stabilization x 20 reps at 30/60/90/120 for FE and YOSELIN and 30/60 for IRER   There ex:   Sidelying scapular retraction on the L 10 reps   Sidelying towel abduction 10 reps ER x 2 sets   Supine hands behind head elbows in and out stretch with pillow under h flexion AROM to >160 degrees to be able to reach into overhead cabinets without pain or restriction   · Pt will improve shoulder abduction AROM to >125 degrees to improve ability to don deodorant, don/doff shirts, and wash hair   · Pt will increase shoulde

## 2021-07-07 ENCOUNTER — OFFICE VISIT (OUTPATIENT)
Dept: PHYSICAL THERAPY | Age: 51
End: 2021-07-07
Attending: FAMILY MEDICINE
Payer: COMMERCIAL

## 2021-07-07 PROCEDURE — 97140 MANUAL THERAPY 1/> REGIONS: CPT

## 2021-07-07 PROCEDURE — 97110 THERAPEUTIC EXERCISES: CPT

## 2021-07-08 NOTE — PROGRESS NOTES
Dx:  L shoulder adhesive capsulitis with possible underlying strain           Insurance (Authorized # of Visits):  Barry Antonio Physician: Dr. Alek Smith  Next MD visit: none scheduled  Fall Risk: standard         Precautions: n/a shoulder flexion AROM to >160 degrees to be able to reach into overhead cabinets without pain or restriction   · Pt will improve shoulder abduction AROM to >125 degrees to improve ability to don deodorant, don/doff shirts, and wash hair   · Pt will increas

## 2021-07-12 ENCOUNTER — APPOINTMENT (OUTPATIENT)
Dept: PHYSICAL THERAPY | Age: 51
End: 2021-07-12
Attending: FAMILY MEDICINE
Payer: COMMERCIAL

## 2021-07-19 ENCOUNTER — OFFICE VISIT (OUTPATIENT)
Dept: PHYSICAL THERAPY | Age: 51
End: 2021-07-19
Attending: FAMILY MEDICINE
Payer: COMMERCIAL

## 2021-07-19 PROCEDURE — 97140 MANUAL THERAPY 1/> REGIONS: CPT

## 2021-07-19 PROCEDURE — 97110 THERAPEUTIC EXERCISES: CPT

## 2021-07-19 NOTE — PROGRESS NOTES
Dx:  L shoulder adhesive capsulitis with possible underlying strain           Insurance (Authorized # of Visits):  Howie Collins Physician: Dr. Anay Orosco  Next MD visit: none scheduled  Fall Risk: standard         Precautions: n/a 20    CabCol 36# x 20 Add/Extn  Sleeper stretch 10 sec x 10 (HEP)                 Assessment: Responded well. Improved posterior and inferior glides with manual intervention.       Goals: (to be met in 8 visits) Progressing toward goals 6/2/2021   · Pt margarita

## 2021-07-21 ENCOUNTER — OFFICE VISIT (OUTPATIENT)
Dept: PHYSICAL THERAPY | Age: 51
End: 2021-07-21
Attending: FAMILY MEDICINE
Payer: COMMERCIAL

## 2021-07-21 ENCOUNTER — APPOINTMENT (OUTPATIENT)
Dept: PHYSICAL THERAPY | Age: 51
End: 2021-07-21
Attending: FAMILY MEDICINE
Payer: COMMERCIAL

## 2021-07-21 PROCEDURE — 97110 THERAPEUTIC EXERCISES: CPT

## 2021-07-21 PROCEDURE — 97140 MANUAL THERAPY 1/> REGIONS: CPT

## 2021-07-22 ENCOUNTER — TELEPHONE (OUTPATIENT)
Dept: INTERNAL MEDICINE CLINIC | Facility: CLINIC | Age: 51
End: 2021-07-22

## 2021-07-22 NOTE — PROGRESS NOTES
Progress Summary  Pt has attended 9 visits in Physical Therapy.    Dx:  L shoulder adhesive capsulitis with possible underlying strain           Insurance (Authorized # of Visits):  [address] [facility] Physician: Dr. Arlene Lawler MD visit: no steering wheel without pain (NOT MET)  · Pt will improve shoulder strength throughout to 4/5 to improve function with lifting weights for indep gym routine (MET)  · Pt will be independent and compliant with comprehensive HEP to maintain progress achieved i ER/ABD 2# x 20  Supine Rows and Diagonals red x 20    CabCol 36# x 20 Add/Extn    Standing yellow band ER towel abduction yellow band 10 reps; red band 10 reps   Scaption 10 reps  There Ex (25 min)  UBE 6 min 3/3  SL ER/ABD 2# x 20  Supine Rows and Diagona

## 2021-07-22 NOTE — TELEPHONE ENCOUNTER
LOV 1/18/21 with AMS. Last PT visit was 7/21/21. Copied notes:    Assessment: Overall, the patient has made good progress with Physical Therapy with the above states mobility progress.   He continues to have an increase in posterior shoulder pain, which I

## 2021-07-22 NOTE — TELEPHONE ENCOUNTER
Patient had injured his L Shoulder and has been doing PT for about 2 months. PT is recommending patient either see Ortho or maybe have an MRI??   Please advise

## 2021-07-23 NOTE — TELEPHONE ENCOUNTER
No answer or vmail. If calls back- PSRs, please inform to call Phuc Siddiqui and schedule. Dr. Etienne Marinelli or first available provider that evaluates shoulder issues. They will let him know if there is someone else. [phone]. Thanks.

## 2021-07-27 ENCOUNTER — TELEPHONE (OUTPATIENT)
Dept: ORTHOPEDICS CLINIC | Facility: CLINIC | Age: 51
End: 2021-07-27

## 2021-07-27 DIAGNOSIS — M25.512 LEFT SHOULDER PAIN, UNSPECIFIED CHRONICITY: Primary | ICD-10-CM

## 2021-07-27 NOTE — TELEPHONE ENCOUNTER
Pt called back stating he never received a call from us and I told him we did call him on 7/23 but got no ans and there was no VM-I gave him the info listed below

## 2021-07-27 NOTE — TELEPHONE ENCOUNTER
• Reviewed patients chart, xray orders are required. Order placed for left shoulder xrays  • Message also sent to patients [facility].   Future Appointments   Date Time Provider Beltran Stewart   8/9/2021  8:05 AM NAP XR RM1 NAP XRAY [facility] [place]   8/9/2021

## 2021-07-27 NOTE — TELEPHONE ENCOUNTER
Future Appointments   Date Time Provider Beltran Pat   8/9/2021  8:20 Penelope Mortensen MD [facility] ORTHO [facility]     Pt doesn't have any imaging. Pain even after doing 2 mos PT. Please advice.

## 2021-08-09 ENCOUNTER — OFFICE VISIT (OUTPATIENT)
Dept: ORTHOPEDICS CLINIC | Facility: CLINIC | Age: 51
End: 2021-08-09
Payer: COMMERCIAL

## 2021-08-09 ENCOUNTER — HOSPITAL ENCOUNTER (OUTPATIENT)
Dept: GENERAL RADIOLOGY | Age: 51
Discharge: HOME | End: 2021-08-09
Attending: ORTHOPAEDIC SURGERY
Payer: COMMERCIAL

## 2021-08-09 DIAGNOSIS — M25.512 LEFT SHOULDER PAIN, UNSPECIFIED CHRONICITY: ICD-10-CM

## 2021-08-09 DIAGNOSIS — M25.512 LEFT SHOULDER PAIN, UNSPECIFIED CHRONICITY: Primary | ICD-10-CM

## 2021-08-09 PROCEDURE — 99204 OFFICE O/P NEW MOD 45 MIN: CPT | Performed by: ORTHOPAEDIC SURGERY

## 2021-08-09 PROCEDURE — 73030 X-RAY EXAM OF SHOULDER: CPT | Performed by: ORTHOPAEDIC SURGERY

## 2021-08-09 NOTE — H&P
[facility] - ORTHOPEDICS  [address] [address]  [phone]     NEW PATIENT VISIT - HISTORY AND PHYSICAL EXAMINATION     Name: Radha Spine   MRN: [id number]  Date: 8/9/2021     CC: Left should Tobacco Use      Smoking status: Never Smoker      Smokeless tobacco: Never Used    Alcohol use: No      Alcohol/week: 0.0 standard drinks      PE:   There were no vitals filed for this visit.   Estimated body mass index is 26.94 kg/m² as calculated from Circulation:   Normal, 2+ radial pulse    The contralateral upper extremity is without limitation in range of motion or strength, no positive provocative maneuvers.      Radiographic Examination/Diagnostics:    Radiographs of the left shoulder personally vi

## 2021-08-17 ENCOUNTER — OFFICE VISIT (OUTPATIENT)
Dept: ORTHOPEDICS CLINIC | Facility: CLINIC | Age: 51
End: 2021-08-17
Payer: COMMERCIAL

## 2021-08-17 DIAGNOSIS — M75.42 SUBACROMIAL IMPINGEMENT OF LEFT SHOULDER: Primary | ICD-10-CM

## 2021-08-17 PROCEDURE — 99213 OFFICE O/P EST LOW 20 MIN: CPT | Performed by: ORTHOPAEDIC SURGERY

## 2021-08-17 PROCEDURE — 20610 DRAIN/INJ JOINT/BURSA W/O US: CPT | Performed by: ORTHOPAEDIC SURGERY

## 2021-08-17 RX ORDER — KETOROLAC TROMETHAMINE 30 MG/ML
30 INJECTION, SOLUTION INTRAMUSCULAR; INTRAVENOUS ONCE
Status: COMPLETED | OUTPATIENT
Start: 2021-08-17 | End: 2021-08-17

## 2021-08-17 RX ORDER — TRIAMCINOLONE ACETONIDE 40 MG/ML
40 INJECTION, SUSPENSION INTRA-ARTICULAR; INTRAMUSCULAR ONCE
Status: COMPLETED | OUTPATIENT
Start: 2021-08-17 | End: 2021-08-17

## 2021-08-17 RX ADMIN — TRIAMCINOLONE ACETONIDE 40 MG: 40 INJECTION, SUSPENSION INTRA-ARTICULAR; INTRAMUSCULAR at 11:29:00

## 2021-08-17 RX ADMIN — KETOROLAC TROMETHAMINE 30 MG: 30 INJECTION, SOLUTION INTRAMUSCULAR; INTRAVENOUS at 11:29:00

## 2021-08-17 NOTE — PROGRESS NOTES
[facility]  [address] [place] [address]       Name: Arabella Estrada   MRN: [id number]  Date: 8/17/2021     REASON FOR VISIT: Follow-up evaluation for left shoulder and MRI revi test.  Slight evidence of subacromial impingement with positive impingement testing. The contralateral upper extremity is without limitation in range of motion or strength, no positive provocative maneuvers.      Radiographic Examination/Diagnostics: Bankart deformity. Long head of biceps is of normal signal and morphology and is appropriately seated in the bicipital groove. The acromioclavicular joint appears unremarkable.  Glenohumeral articulation demonstrates no cartilage loss or interruption of the

## 2021-08-17 NOTE — PROCEDURES
Left Shoulder Glenohumeral Joint and subacromial space Injection    Name: Dilan Morgan   MRN: [id number]  Date: 8/17/2021     Clinical Indications:   Adhesive Capsulitis.  And subacromial impingement    After informed consent, the injection site was mar

## 2021-09-27 ENCOUNTER — TELEMEDICINE (OUTPATIENT)
Dept: INTERNAL MEDICINE CLINIC | Facility: CLINIC | Age: 51
End: 2021-09-27
Payer: COMMERCIAL

## 2021-09-27 DIAGNOSIS — L30.9 DERMATITIS: Primary | ICD-10-CM

## 2021-09-27 PROCEDURE — 99213 OFFICE O/P EST LOW 20 MIN: CPT | Performed by: FAMILY MEDICINE

## 2021-09-27 RX ORDER — BETAMETHASONE DIPROPIONATE 0.05 %
OINTMENT (GRAM) TOPICAL
Qty: 50 G | Refills: 0 | Status: SHIPPED | OUTPATIENT
Start: 2021-09-27 | End: 2021-11-06 | Stop reason: ALTCHOICE

## 2021-09-27 NOTE — PROGRESS NOTES
Subjective:   Patient ID: Jovana Washington is a 46year old male. Virtual Telephone Check-In    The patient verbally consents to a Virtual/Telephone Check-In visit on 9/27/21.     Patient understands and accepts financial responsibility for any deductible Pulmonary effort is normal.   Skin:     Comments: Through video visit- irritated, erythematous papular rash over elbows b/l, some peeling skin   Neurological:      Mental Status: He is alert.    Psychiatric:         Behavior: Behavior normal.         Assess

## 2021-10-01 ENCOUNTER — MED REC SCAN ONLY (OUTPATIENT)
Dept: ORTHOPEDICS CLINIC | Facility: CLINIC | Age: 51
End: 2021-10-01

## 2021-11-06 ENCOUNTER — OFFICE VISIT (OUTPATIENT)
Dept: INTERNAL MEDICINE CLINIC | Facility: CLINIC | Age: 51
End: 2021-11-06
Payer: COMMERCIAL

## 2021-11-06 VITALS
TEMPERATURE: 98 F | DIASTOLIC BLOOD PRESSURE: 80 MMHG | HEIGHT: 72 IN | HEART RATE: 68 BPM | OXYGEN SATURATION: 98 % | WEIGHT: 187.63 LBS | RESPIRATION RATE: 16 BRPM | SYSTOLIC BLOOD PRESSURE: 122 MMHG | BODY MASS INDEX: 25.41 KG/M2

## 2021-11-06 DIAGNOSIS — R10.32 LEFT INGUINAL PAIN: Primary | ICD-10-CM

## 2021-11-06 PROCEDURE — 3008F BODY MASS INDEX DOCD: CPT | Performed by: FAMILY MEDICINE

## 2021-11-06 PROCEDURE — 99214 OFFICE O/P EST MOD 30 MIN: CPT | Performed by: FAMILY MEDICINE

## 2021-11-06 PROCEDURE — 3079F DIAST BP 80-89 MM HG: CPT | Performed by: FAMILY MEDICINE

## 2021-11-06 PROCEDURE — 3074F SYST BP LT 130 MM HG: CPT | Performed by: FAMILY MEDICINE

## 2021-11-06 RX ORDER — NAPROXEN 500 MG/1
500 TABLET ORAL 2 TIMES DAILY WITH MEALS
Qty: 28 TABLET | Refills: 0 | Status: SHIPPED | OUTPATIENT
Start: 2021-11-06 | End: 2021-11-20

## 2021-11-06 NOTE — PROGRESS NOTES
Subjective:   Patient ID: Inessa Ferrera is a 46year old male. HPI Here with about one month of pain in left groin/inguinal area. Pain started out of nowhere. Seems to be worse as the day goes on.  Not related to urinating, lifting, eating, having a This Visit:  Requested Prescriptions     Signed Prescriptions Disp Refills   • naproxen 500 MG Oral Tab 28 tablet 0     Sig: Take 1 tablet (500 mg total) by mouth 2 (two) times daily with meals for 14 days.        Imaging & Referrals:  None

## 2021-12-27 ENCOUNTER — TELEPHONE (OUTPATIENT)
Dept: INTERNAL MEDICINE CLINIC | Facility: CLINIC | Age: 51
End: 2021-12-27

## 2021-12-27 DIAGNOSIS — Z12.5 SCREENING PSA (PROSTATE SPECIFIC ANTIGEN): ICD-10-CM

## 2021-12-27 DIAGNOSIS — R73.01 IMPAIRED FASTING GLUCOSE: ICD-10-CM

## 2021-12-27 DIAGNOSIS — Z00.00 ANNUAL PHYSICAL EXAM: Primary | ICD-10-CM

## 2021-12-27 DIAGNOSIS — E78.5 HYPERLIPIDEMIA, UNSPECIFIED HYPERLIPIDEMIA TYPE: ICD-10-CM

## 2021-12-27 DIAGNOSIS — Z13.228 SCREENING FOR METABOLIC DISORDER: ICD-10-CM

## 2021-12-27 DIAGNOSIS — Z13.0 SCREENING FOR BLOOD DISEASE: ICD-10-CM

## 2021-12-27 DIAGNOSIS — Z13.0 SCREENING FOR DISORDER OF BLOOD AND BLOOD-FORMING ORGANS: ICD-10-CM

## 2021-12-27 NOTE — TELEPHONE ENCOUNTER
Future Appointments   Date Time Provider Beltran Pat   1/17/2022  9:00 AM Nhan Pacheco, DO [facility]     Orders to edward- Pt informed that labs need to be completed no sooner than 2 weeks prior to the appt.  Pt aware to fast-no call magali

## 2022-01-14 ENCOUNTER — LABORATORY ENCOUNTER (OUTPATIENT)
Dept: LAB | Age: 52
End: 2022-01-14
Attending: FAMILY MEDICINE
Payer: COMMERCIAL

## 2022-01-14 DIAGNOSIS — Z00.00 ANNUAL PHYSICAL EXAM: ICD-10-CM

## 2022-01-14 DIAGNOSIS — R73.01 IMPAIRED FASTING GLUCOSE: ICD-10-CM

## 2022-01-14 DIAGNOSIS — Z13.0 SCREENING FOR DISORDER OF BLOOD AND BLOOD-FORMING ORGANS: ICD-10-CM

## 2022-01-14 DIAGNOSIS — Z12.5 SCREENING PSA (PROSTATE SPECIFIC ANTIGEN): ICD-10-CM

## 2022-01-14 DIAGNOSIS — Z13.0 SCREENING FOR BLOOD DISEASE: ICD-10-CM

## 2022-01-14 DIAGNOSIS — Z13.228 SCREENING FOR METABOLIC DISORDER: ICD-10-CM

## 2022-01-14 DIAGNOSIS — E78.5 HYPERLIPIDEMIA, UNSPECIFIED HYPERLIPIDEMIA TYPE: ICD-10-CM

## 2022-01-14 LAB
ALBUMIN SERPL-MCNC: 3.9 G/DL (ref 3.4–5)
ALBUMIN/GLOB SERPL: 1.1 {RATIO} (ref 1–2)
ALP LIVER SERPL-CCNC: 63 U/L
ALT SERPL-CCNC: 36 U/L
ANION GAP SERPL CALC-SCNC: 7 MMOL/L (ref 0–18)
AST SERPL-CCNC: 18 U/L (ref 15–37)
BASOPHILS # BLD AUTO: 0.07 X10(3) UL (ref 0–0.2)
BASOPHILS NFR BLD AUTO: 1.1 %
BILIRUB SERPL-MCNC: 0.6 MG/DL (ref 0.1–2)
BUN BLD-MCNC: 18 MG/DL (ref 7–18)
CALCIUM BLD-MCNC: 9.4 MG/DL (ref 8.5–10.1)
CHLORIDE SERPL-SCNC: 109 MMOL/L (ref 98–112)
CHOLEST SERPL-MCNC: 228 MG/DL (ref ?–200)
CO2 SERPL-SCNC: 26 MMOL/L (ref 21–32)
COMPLEXED PSA SERPL-MCNC: 1.74 NG/ML (ref ?–4)
CREAT BLD-MCNC: 1.23 MG/DL
EOSINOPHIL # BLD AUTO: 0.08 X10(3) UL (ref 0–0.7)
EOSINOPHIL NFR BLD AUTO: 1.3 %
ERYTHROCYTE [DISTWIDTH] IN BLOOD BY AUTOMATED COUNT: 12.7 %
EST. AVERAGE GLUCOSE BLD GHB EST-MCNC: 114 MG/DL (ref 68–126)
FASTING PATIENT LIPID ANSWER: YES
FASTING STATUS PATIENT QL REPORTED: YES
GLOBULIN PLAS-MCNC: 3.4 G/DL (ref 2.8–4.4)
GLUCOSE BLD-MCNC: 96 MG/DL (ref 70–99)
HBA1C MFR BLD: 5.6 % (ref ?–5.7)
HCT VFR BLD AUTO: 44.2 %
HDLC SERPL-MCNC: 43 MG/DL (ref 40–59)
HGB BLD-MCNC: 14.3 G/DL
IMM GRANULOCYTES # BLD AUTO: 0.02 X10(3) UL (ref 0–1)
IMM GRANULOCYTES NFR BLD: 0.3 %
LDLC SERPL CALC-MCNC: 161 MG/DL (ref ?–100)
LYMPHOCYTES # BLD AUTO: 2.09 X10(3) UL (ref 1–4)
LYMPHOCYTES NFR BLD AUTO: 33 %
MCH RBC QN AUTO: 30.6 PG (ref 26–34)
MCHC RBC AUTO-ENTMCNC: 32.4 G/DL (ref 31–37)
MCV RBC AUTO: 94.6 FL
MONOCYTES # BLD AUTO: 0.53 X10(3) UL (ref 0.1–1)
MONOCYTES NFR BLD AUTO: 8.4 %
NEUTROPHILS # BLD AUTO: 3.54 X10 (3) UL (ref 1.5–7.7)
NEUTROPHILS # BLD AUTO: 3.54 X10(3) UL (ref 1.5–7.7)
NEUTROPHILS NFR BLD AUTO: 55.9 %
NONHDLC SERPL-MCNC: 185 MG/DL (ref ?–130)
OSMOLALITY SERPL CALC.SUM OF ELEC: 296 MOSM/KG (ref 275–295)
PLATELET # BLD AUTO: 228 10(3)UL (ref 150–450)
POTASSIUM SERPL-SCNC: 4.8 MMOL/L (ref 3.5–5.1)
PROT SERPL-MCNC: 7.3 G/DL (ref 6.4–8.2)
RBC # BLD AUTO: 4.67 X10(6)UL
SODIUM SERPL-SCNC: 142 MMOL/L (ref 136–145)
TRIGL SERPL-MCNC: 133 MG/DL (ref 30–149)
VLDLC SERPL CALC-MCNC: 26 MG/DL (ref 0–30)
WBC # BLD AUTO: 6.3 X10(3) UL (ref 4–11)

## 2022-01-14 PROCEDURE — G0103 PSA SCREENING: HCPCS | Performed by: FAMILY MEDICINE

## 2022-01-14 PROCEDURE — 80061 LIPID PANEL: CPT | Performed by: FAMILY MEDICINE

## 2022-01-14 PROCEDURE — 80053 COMPREHEN METABOLIC PANEL: CPT | Performed by: FAMILY MEDICINE

## 2022-01-14 PROCEDURE — 85025 COMPLETE CBC W/AUTO DIFF WBC: CPT | Performed by: FAMILY MEDICINE

## 2022-01-14 PROCEDURE — 83036 HEMOGLOBIN GLYCOSYLATED A1C: CPT | Performed by: FAMILY MEDICINE

## 2022-01-17 ENCOUNTER — OFFICE VISIT (OUTPATIENT)
Dept: INTERNAL MEDICINE CLINIC | Facility: CLINIC | Age: 52
End: 2022-01-17
Payer: COMMERCIAL

## 2022-01-17 VITALS
BODY MASS INDEX: 24.25 KG/M2 | HEIGHT: 73 IN | SYSTOLIC BLOOD PRESSURE: 110 MMHG | RESPIRATION RATE: 16 BRPM | WEIGHT: 183 LBS | TEMPERATURE: 98 F | OXYGEN SATURATION: 99 % | DIASTOLIC BLOOD PRESSURE: 64 MMHG | HEART RATE: 68 BPM

## 2022-01-17 DIAGNOSIS — E78.5 HYPERLIPIDEMIA, UNSPECIFIED HYPERLIPIDEMIA TYPE: ICD-10-CM

## 2022-01-17 DIAGNOSIS — Z00.00 ANNUAL PHYSICAL EXAM: Primary | ICD-10-CM

## 2022-01-17 DIAGNOSIS — R10.32 LEFT INGUINAL PAIN: ICD-10-CM

## 2022-01-17 PROCEDURE — 3008F BODY MASS INDEX DOCD: CPT | Performed by: FAMILY MEDICINE

## 2022-01-17 PROCEDURE — 3078F DIAST BP <80 MM HG: CPT | Performed by: FAMILY MEDICINE

## 2022-01-17 PROCEDURE — 99212 OFFICE O/P EST SF 10 MIN: CPT | Performed by: FAMILY MEDICINE

## 2022-01-17 PROCEDURE — 99396 PREV VISIT EST AGE 40-64: CPT | Performed by: FAMILY MEDICINE

## 2022-01-17 PROCEDURE — 3074F SYST BP LT 130 MM HG: CPT | Performed by: FAMILY MEDICINE

## 2022-01-17 RX ORDER — ATORVASTATIN CALCIUM 40 MG/1
40 TABLET, FILM COATED ORAL NIGHTLY
Qty: 90 TABLET | Refills: 0 | Status: SHIPPED | OUTPATIENT
Start: 2022-01-17

## 2022-01-17 NOTE — PROGRESS NOTES
Subjective:   Patient ID: Hayder Mclain is a 46year old male. HPI Here for annual check-up. Patient is eating healthy and exercising regularly. Continues to have left inguinal pain that is not related to movement.  Hurts often when he is laying in numbness and headaches. Hematological: Negative for adenopathy. Does not bruise/bleed easily. Psychiatric/Behavioral: Negative for dysphoric mood. The patient is not nervous/anxious.       Current Outpatient Medications   Medication Sig Dispense Refill 3. Start statin. Discussed risks/benefits/potential side effects and proper use of medication. Recheck lipids and CMP in 3 months.    Orders Placed This Encounter      Comp Metabolic Panel (14) [E]      Lipid Panel      Meds This Visit:  Requested Presc

## 2022-01-17 NOTE — PATIENT INSTRUCTIONS
Prevention Guidelines, Men Ages 48 to 59  Screening tests and vaccines are an important part of managing your health. A screening test is done to find diseases in people who don't have any symptoms.  The goal is to find a disease early so lifestyle beyer in this age group At routine exams   Type 2 diabetes or prediabetes All men beginning at age 39 and men without symptoms at any age who are overweight or obese and have 1 or more other risk factors for diabetes At least every 3 years (yearly if your blood needs it How often   Chickenpox (varicella) All men in this age group who have no record of this infection or vaccine 2 doses; second dose should be given at least 4 weeks after the first dose   Hepatitis A Men at increased risk for infection 2 or 3 doses age group 2 doses; the 2nd dose is given 2 to 6 months after the first. This is given even if you've had shingles before, not sure if you have had chickenpox, or had a previous zoster live vaccine   Counseling Who needs it How often   Diet and exercise Men

## 2022-01-18 RX ORDER — VALACYCLOVIR HYDROCHLORIDE 1 G/1
TABLET, FILM COATED ORAL
Qty: 21 TABLET | Refills: 0 | Status: SHIPPED | OUTPATIENT
Start: 2022-01-18

## 2022-01-18 NOTE — TELEPHONE ENCOUNTER
Medication(s) to Refill:   Requested Prescriptions     Pending Prescriptions Disp Refills   • VALACYCLOVIR 1 G Oral Tab [Pharmacy Med Name: VALACYCLOVIR 1GM TABLETS] 21 tablet 0     Sig: TAKE 1 TABLET(1000 MG) BY MOUTH THREE TIMES DAILY FOR 7 DOSES       L child/children without a caregiver.  Because of the highly sensitive equipment and privacy of all our patients, children will not be permitted in the exam rooms, unless otherwise noted and in accordance with departmental policy.    PATIENT RESPONSIBILITY E order electronically or faxed the order. Without the order, your test may be delayed or postponed.     Children: Children under the age of 15 must have another adult caregiver with them.  Please do not bring your child/children without a caregiver.  Because

## 2022-01-30 ENCOUNTER — HOSPITAL ENCOUNTER (OUTPATIENT)
Dept: ULTRASOUND IMAGING | Age: 52
Discharge: HOME | End: 2022-01-30
Attending: FAMILY MEDICINE
Payer: COMMERCIAL

## 2022-01-30 DIAGNOSIS — R10.32 LEFT INGUINAL PAIN: ICD-10-CM

## 2022-01-30 PROCEDURE — 76882 US LMTD JT/FCL EVL NVASC XTR: CPT | Performed by: FAMILY MEDICINE

## 2022-01-30 PROCEDURE — 93975 VASCULAR STUDY: CPT | Performed by: FAMILY MEDICINE

## 2022-01-30 PROCEDURE — 76870 US EXAM SCROTUM: CPT | Performed by: FAMILY MEDICINE

## 2022-02-04 ENCOUNTER — OFFICE VISIT (OUTPATIENT)
Dept: SURGERY | Facility: CLINIC | Age: 52
End: 2022-02-04
Payer: COMMERCIAL

## 2022-02-04 DIAGNOSIS — R82.90 URINE FINDING: Primary | ICD-10-CM

## 2022-02-04 DIAGNOSIS — N45.1 CHRONIC EPIDIDYMITIS: ICD-10-CM

## 2022-02-04 PROCEDURE — 99243 OFF/OP CNSLTJ NEW/EST LOW 30: CPT | Performed by: UROLOGY

## 2022-02-04 RX ORDER — NAPROXEN 375 MG/1
375 TABLET ORAL 2 TIMES DAILY WITH MEALS
Qty: 60 TABLET | Refills: 1 | Status: SHIPPED | OUTPATIENT
Start: 2022-02-04

## 2022-02-04 RX ORDER — DOXYCYCLINE 100 MG/1
100 TABLET ORAL 2 TIMES DAILY
Qty: 28 TABLET | Refills: 1 | Status: SHIPPED | OUTPATIENT
Start: 2022-02-04 | End: 2022-02-18

## 2022-03-18 ENCOUNTER — OFFICE VISIT (OUTPATIENT)
Dept: SURGERY | Facility: CLINIC | Age: 52
End: 2022-03-18
Payer: COMMERCIAL

## 2022-03-18 DIAGNOSIS — N45.1 CHRONIC EPIDIDYMITIS: Primary | ICD-10-CM

## 2022-03-18 PROCEDURE — 99213 OFFICE O/P EST LOW 20 MIN: CPT | Performed by: UROLOGY

## 2022-03-18 RX ORDER — DOXYCYCLINE 100 MG/1
100 TABLET ORAL 2 TIMES DAILY
Qty: 28 TABLET | Refills: 1 | Status: SHIPPED | OUTPATIENT
Start: 2022-03-18 | End: 2022-04-01

## 2022-03-21 RX ORDER — TOPIRAMATE 25 MG/1
TABLET ORAL
Qty: 90 TABLET | Refills: 3 | Status: SHIPPED | OUTPATIENT
Start: 2022-03-21

## 2022-03-21 NOTE — TELEPHONE ENCOUNTER
Last visit- 0117/2022 cpe     Last refill- 01/19/2021 topiramate 25mg QTY90 3R    Last labs- 01/14/2022 psa screen, hemoglobin a1c, lipid, cmp, cbc    No future appointments.

## 2022-04-22 RX ORDER — ATORVASTATIN CALCIUM 40 MG/1
TABLET, FILM COATED ORAL
Qty: 90 TABLET | Refills: 2 | Status: SHIPPED | OUTPATIENT
Start: 2022-04-22

## 2022-04-22 NOTE — TELEPHONE ENCOUNTER
Last visit- 01/17/2022 cpe     Last refill- 01/17/2022 atorvastatin 40mg QTY90 0R    Last labs- 01/14/2022 psa screen, hemoglobin a1c, lipid, cmp, cbc    No future appointments.

## 2022-05-05 ENCOUNTER — TELEPHONE (OUTPATIENT)
Dept: INTERNAL MEDICINE CLINIC | Facility: CLINIC | Age: 52
End: 2022-05-05

## 2022-05-05 NOTE — TELEPHONE ENCOUNTER
Fully vaccinated, per med hx not high risk. Patient states sore throat, fluid in left ear, congestion, cough. No fever at this time. Patient notified symptom support- flonase, zyrtec, claritin, throat drops, tea with honey. With any immediate concerns or worsening of symptoms call and or [facility] for evaluation. Notified if sob, cp, severe weakness needs to be seen in ED. Patient voiced understanding. Patient exposed last weekend at work. Symptoms two days ago, tested positive today. CDC guidelines discussed. Patient denies questions or concerns. Sent to [facility] for [id number] [address] [place].

## 2022-05-05 NOTE — TELEPHONE ENCOUNTER
Patient tested positive for Covid yesterday. Patient has a sore throat, earache L ear, cough, little bit of runny nose. Patient tested positive and home and at [facility].

## 2022-05-11 ENCOUNTER — TELEMEDICINE (OUTPATIENT)
Dept: TELEHEALTH | Age: 52
End: 2022-05-11

## 2022-05-11 DIAGNOSIS — U07.1 COVID-19: Primary | ICD-10-CM

## 2022-05-11 PROCEDURE — 99213 OFFICE O/P EST LOW 20 MIN: CPT | Performed by: NURSE PRACTITIONER

## 2022-05-27 ENCOUNTER — OFFICE VISIT (OUTPATIENT)
Dept: INTERNAL MEDICINE CLINIC | Facility: CLINIC | Age: 52
End: 2022-05-27
Payer: COMMERCIAL

## 2022-05-27 VITALS
SYSTOLIC BLOOD PRESSURE: 118 MMHG | DIASTOLIC BLOOD PRESSURE: 60 MMHG | OXYGEN SATURATION: 99 % | HEIGHT: 73 IN | HEART RATE: 70 BPM | BODY MASS INDEX: 25.66 KG/M2 | WEIGHT: 193.63 LBS

## 2022-05-27 DIAGNOSIS — J34.89 RHINORRHEA: ICD-10-CM

## 2022-05-27 DIAGNOSIS — R09.81 NASAL CONGESTION: Primary | ICD-10-CM

## 2022-05-27 DIAGNOSIS — Z86.16 PERSONAL HISTORY OF COVID-19: ICD-10-CM

## 2022-05-27 PROCEDURE — 3074F SYST BP LT 130 MM HG: CPT | Performed by: FAMILY MEDICINE

## 2022-05-27 PROCEDURE — 99214 OFFICE O/P EST MOD 30 MIN: CPT | Performed by: FAMILY MEDICINE

## 2022-05-27 PROCEDURE — 3008F BODY MASS INDEX DOCD: CPT | Performed by: FAMILY MEDICINE

## 2022-05-27 PROCEDURE — 3078F DIAST BP <80 MM HG: CPT | Performed by: FAMILY MEDICINE

## 2022-05-27 RX ORDER — METHYLPREDNISOLONE 4 MG/1
TABLET ORAL
Qty: 1 EACH | Refills: 0 | Status: SHIPPED | OUTPATIENT
Start: 2022-05-27

## 2022-05-27 RX ORDER — AZELASTINE 1 MG/ML
1 SPRAY, METERED NASAL 2 TIMES DAILY
Qty: 1 EACH | Refills: 0 | Status: SHIPPED | OUTPATIENT
Start: 2022-05-27 | End: 2022-05-27

## 2022-05-27 RX ORDER — DOXYCYCLINE HYCLATE 100 MG/1
100 CAPSULE ORAL 2 TIMES DAILY
Qty: 14 CAPSULE | Refills: 0 | Status: SHIPPED | OUTPATIENT
Start: 2022-05-27 | End: 2022-06-03

## 2022-05-27 RX ORDER — AZELASTINE 1 MG/ML
SPRAY, METERED NASAL
Qty: 90 ML | Refills: 0 | Status: SHIPPED | OUTPATIENT
Start: 2022-05-27

## 2022-05-27 NOTE — TELEPHONE ENCOUNTER
Last visit- 05/27/2022 nasal congestion     Last refill- 05/27/2022 azelastine 0.1% nasal solution [id number] 0R    Last labs- 01/14/2022 psa screen, hemoglobin a1c, lipid, cmp, cbc    No future appointments.

## 2022-06-09 RX ORDER — VALACYCLOVIR HYDROCHLORIDE 1 G/1
TABLET, FILM COATED ORAL
Qty: 21 TABLET | Refills: 2 | Status: SHIPPED | OUTPATIENT
Start: 2022-06-09

## 2022-06-09 NOTE — TELEPHONE ENCOUNTER
Last visit- 05/27/2022 nasal congestion     Last refill- 01/18/2022 valacyclovir 1g QTY21 0R    Last labs- 01/14/2022 psa screen, hemoglobin a1c, lipid, cmp, cbc    No future appointments.

## 2023-01-03 ENCOUNTER — TELEPHONE (OUTPATIENT)
Dept: INTERNAL MEDICINE CLINIC | Facility: CLINIC | Age: 53
End: 2023-01-03

## 2023-01-03 DIAGNOSIS — Z13.228 SCREENING FOR METABOLIC DISORDER: ICD-10-CM

## 2023-01-03 DIAGNOSIS — Z13.0 SCREENING FOR DISORDER OF BLOOD AND BLOOD-FORMING ORGANS: ICD-10-CM

## 2023-01-03 DIAGNOSIS — Z00.00 ANNUAL PHYSICAL EXAM: Primary | ICD-10-CM

## 2023-01-03 DIAGNOSIS — Z13.0 SCREENING FOR BLOOD DISEASE: ICD-10-CM

## 2023-01-03 DIAGNOSIS — Z12.5 SCREENING PSA (PROSTATE SPECIFIC ANTIGEN): ICD-10-CM

## 2023-01-03 DIAGNOSIS — E78.5 HYPERLIPIDEMIA, UNSPECIFIED HYPERLIPIDEMIA TYPE: ICD-10-CM

## 2023-01-03 DIAGNOSIS — R73.01 IMPAIRED FASTING GLUCOSE: ICD-10-CM

## 2023-01-03 NOTE — TELEPHONE ENCOUNTER
Orders to THE [facility] Pt aware to get labs done no sooner than 2 weeks prior to the appt. Pt aware to fast.  No call back required.   Future Appointments   Date Time Provider [place]tran Stewart   1/20/2023  8:30 AM Cherri Schilling DO [facility]

## 2023-01-14 ENCOUNTER — LABORATORY ENCOUNTER (OUTPATIENT)
Dept: LAB | Age: 53
End: 2023-01-14
Attending: FAMILY MEDICINE
Payer: COMMERCIAL

## 2023-01-14 DIAGNOSIS — Z12.5 SCREENING PSA (PROSTATE SPECIFIC ANTIGEN): ICD-10-CM

## 2023-01-14 DIAGNOSIS — Z00.00 ANNUAL PHYSICAL EXAM: ICD-10-CM

## 2023-01-14 DIAGNOSIS — Z13.0 SCREENING FOR BLOOD DISEASE: ICD-10-CM

## 2023-01-14 DIAGNOSIS — R73.01 IMPAIRED FASTING GLUCOSE: ICD-10-CM

## 2023-01-14 DIAGNOSIS — Z13.228 SCREENING FOR METABOLIC DISORDER: ICD-10-CM

## 2023-01-14 DIAGNOSIS — Z13.0 SCREENING FOR DISORDER OF BLOOD AND BLOOD-FORMING ORGANS: ICD-10-CM

## 2023-01-14 DIAGNOSIS — E78.5 HYPERLIPIDEMIA, UNSPECIFIED HYPERLIPIDEMIA TYPE: ICD-10-CM

## 2023-01-14 LAB
ALBUMIN SERPL-MCNC: 3.9 G/DL (ref 3.4–5)
ALBUMIN/GLOB SERPL: 1.3 {RATIO} (ref 1–2)
ALP LIVER SERPL-CCNC: 76 U/L
ALT SERPL-CCNC: 52 U/L
ANION GAP SERPL CALC-SCNC: 6 MMOL/L (ref 0–18)
AST SERPL-CCNC: 31 U/L (ref 15–37)
BASOPHILS # BLD AUTO: 0.07 X10(3) UL (ref 0–0.2)
BASOPHILS NFR BLD AUTO: 1.1 %
BILIRUB SERPL-MCNC: 0.7 MG/DL (ref 0.1–2)
BUN BLD-MCNC: 19 MG/DL (ref 7–18)
CALCIUM BLD-MCNC: 9 MG/DL (ref 8.5–10.1)
CHLORIDE SERPL-SCNC: 112 MMOL/L (ref 98–112)
CHOLEST SERPL-MCNC: 111 MG/DL (ref ?–200)
CO2 SERPL-SCNC: 23 MMOL/L (ref 21–32)
COMPLEXED PSA SERPL-MCNC: 1.99 NG/ML (ref ?–4)
CREAT BLD-MCNC: 1.26 MG/DL
EOSINOPHIL # BLD AUTO: 0.09 X10(3) UL (ref 0–0.7)
EOSINOPHIL NFR BLD AUTO: 1.4 %
ERYTHROCYTE [DISTWIDTH] IN BLOOD BY AUTOMATED COUNT: 12.1 %
EST. AVERAGE GLUCOSE BLD GHB EST-MCNC: 114 MG/DL (ref 68–126)
FASTING PATIENT LIPID ANSWER: YES
FASTING STATUS PATIENT QL REPORTED: YES
GFR SERPLBLD BASED ON 1.73 SQ M-ARVRAT: 69 ML/MIN/1.73M2 (ref 60–?)
GLOBULIN PLAS-MCNC: 3.1 G/DL (ref 2.8–4.4)
GLUCOSE BLD-MCNC: 104 MG/DL (ref 70–99)
HBA1C MFR BLD: 5.6 % (ref ?–5.7)
HCT VFR BLD AUTO: 45.5 %
HDLC SERPL-MCNC: 46 MG/DL (ref 40–59)
HGB BLD-MCNC: 14.8 G/DL
IMM GRANULOCYTES # BLD AUTO: 0.02 X10(3) UL (ref 0–1)
IMM GRANULOCYTES NFR BLD: 0.3 %
LDLC SERPL CALC-MCNC: 54 MG/DL (ref ?–100)
LYMPHOCYTES # BLD AUTO: 2.55 X10(3) UL (ref 1–4)
LYMPHOCYTES NFR BLD AUTO: 38.6 %
MCH RBC QN AUTO: 30.3 PG (ref 26–34)
MCHC RBC AUTO-ENTMCNC: 32.5 G/DL (ref 31–37)
MCV RBC AUTO: 93.2 FL
MONOCYTES # BLD AUTO: 0.58 X10(3) UL (ref 0.1–1)
MONOCYTES NFR BLD AUTO: 8.8 %
NEUTROPHILS # BLD AUTO: 3.3 X10 (3) UL (ref 1.5–7.7)
NEUTROPHILS # BLD AUTO: 3.3 X10(3) UL (ref 1.5–7.7)
NEUTROPHILS NFR BLD AUTO: 49.8 %
NONHDLC SERPL-MCNC: 65 MG/DL (ref ?–130)
OSMOLALITY SERPL CALC.SUM OF ELEC: 295 MOSM/KG (ref 275–295)
PLATELET # BLD AUTO: 227 10(3)UL (ref 150–450)
POTASSIUM SERPL-SCNC: 4.3 MMOL/L (ref 3.5–5.1)
PROT SERPL-MCNC: 7 G/DL (ref 6.4–8.2)
RBC # BLD AUTO: 4.88 X10(6)UL
SODIUM SERPL-SCNC: 141 MMOL/L (ref 136–145)
TRIGL SERPL-MCNC: 40 MG/DL (ref 30–149)
VLDLC SERPL CALC-MCNC: 6 MG/DL (ref 0–30)
WBC # BLD AUTO: 6.6 X10(3) UL (ref 4–11)

## 2023-01-14 PROCEDURE — 84153 ASSAY OF PSA TOTAL: CPT | Performed by: FAMILY MEDICINE

## 2023-01-14 PROCEDURE — 80061 LIPID PANEL: CPT | Performed by: FAMILY MEDICINE

## 2023-01-14 PROCEDURE — 80053 COMPREHEN METABOLIC PANEL: CPT | Performed by: FAMILY MEDICINE

## 2023-01-14 PROCEDURE — 83036 HEMOGLOBIN GLYCOSYLATED A1C: CPT | Performed by: FAMILY MEDICINE

## 2023-01-14 PROCEDURE — 85025 COMPLETE CBC W/AUTO DIFF WBC: CPT | Performed by: FAMILY MEDICINE

## 2023-01-20 ENCOUNTER — OFFICE VISIT (OUTPATIENT)
Dept: INTERNAL MEDICINE CLINIC | Facility: CLINIC | Age: 53
End: 2023-01-20
Payer: COMMERCIAL

## 2023-01-20 ENCOUNTER — PATIENT OUTREACH (OUTPATIENT)
Facility: LOCATION | Age: 53
End: 2023-01-20

## 2023-01-20 VITALS
HEART RATE: 76 BPM | HEIGHT: 72 IN | RESPIRATION RATE: 18 BRPM | WEIGHT: 191.19 LBS | DIASTOLIC BLOOD PRESSURE: 64 MMHG | OXYGEN SATURATION: 98 % | SYSTOLIC BLOOD PRESSURE: 110 MMHG | BODY MASS INDEX: 25.9 KG/M2

## 2023-01-20 DIAGNOSIS — K64.9 HEMORRHOIDS, UNSPECIFIED HEMORRHOID TYPE: ICD-10-CM

## 2023-01-20 DIAGNOSIS — Z00.00 ANNUAL PHYSICAL EXAM: Primary | ICD-10-CM

## 2023-01-20 DIAGNOSIS — E78.5 HYPERLIPIDEMIA, UNSPECIFIED HYPERLIPIDEMIA TYPE: ICD-10-CM

## 2023-01-20 PROCEDURE — 3078F DIAST BP <80 MM HG: CPT | Performed by: FAMILY MEDICINE

## 2023-01-20 PROCEDURE — 3074F SYST BP LT 130 MM HG: CPT | Performed by: FAMILY MEDICINE

## 2023-01-20 PROCEDURE — 3008F BODY MASS INDEX DOCD: CPT | Performed by: FAMILY MEDICINE

## 2023-01-20 PROCEDURE — 99396 PREV VISIT EST AGE 40-64: CPT | Performed by: FAMILY MEDICINE

## 2023-01-27 RX ORDER — ATORVASTATIN CALCIUM 40 MG/1
TABLET, FILM COATED ORAL
Qty: 90 TABLET | Refills: 3 | Status: SHIPPED | OUTPATIENT
Start: 2023-01-27

## 2023-01-27 NOTE — TELEPHONE ENCOUNTER
Last visit- 01/20/2023 cpe     Last refill- 04/22/2022 atorvastatin 40mg QTY90 2R    Last labs- 01/14/2023 hemoglobin a1c, lipid, cmp, cbc, psa screen   Future Appointments   Date Time Provider Beltran Stewart   2/2/2023  8:00 AM Reba Lake MD [facility]

## 2023-02-02 ENCOUNTER — OFFICE VISIT (OUTPATIENT)
Facility: LOCATION | Age: 53
End: 2023-02-02
Payer: COMMERCIAL

## 2023-02-02 DIAGNOSIS — K64.4 INTERNAL AND EXTERNAL BLEEDING HEMORRHOIDS: Primary | ICD-10-CM

## 2023-02-02 DIAGNOSIS — K64.8 INTERNAL AND EXTERNAL BLEEDING HEMORRHOIDS: Primary | ICD-10-CM

## 2023-03-16 ENCOUNTER — OFFICE VISIT (OUTPATIENT)
Facility: LOCATION | Age: 53
End: 2023-03-16
Payer: COMMERCIAL

## 2023-03-16 DIAGNOSIS — K60.2 ANAL FISSURE: Primary | ICD-10-CM

## 2023-03-16 PROCEDURE — 99214 OFFICE O/P EST MOD 30 MIN: CPT | Performed by: STUDENT IN AN ORGANIZED HEALTH CARE EDUCATION/TRAINING PROGRAM

## 2023-03-21 RX ORDER — TOPIRAMATE 25 MG/1
TABLET ORAL
Qty: 90 TABLET | Refills: 3 | Status: SHIPPED | OUTPATIENT
Start: 2023-03-21

## 2023-03-21 NOTE — TELEPHONE ENCOUNTER
Last visit- 01/20/2023 cpe     Last refill- 03/21/2022 topiramate 25mg QTY90 3R    Last labs- 01/14/2023 hemoglobin a1c, lipid, cmp, cbc, psa screen   Future Appointments   Date Time Provider Beltran Stewart   4/13/2023  8:30 AM Beckie Casanova MD [facility]

## 2023-04-13 ENCOUNTER — OFFICE VISIT (OUTPATIENT)
Facility: LOCATION | Age: 53
End: 2023-04-13
Payer: COMMERCIAL

## 2023-04-13 VITALS — HEART RATE: 66 BPM | TEMPERATURE: 97 F

## 2023-04-13 DIAGNOSIS — K60.2 ANAL FISSURE: Primary | ICD-10-CM

## 2023-04-18 DIAGNOSIS — K60.2 ANAL FISSURE: Primary | ICD-10-CM

## 2023-04-18 RX ORDER — IBUPROFEN 600 MG/1
600 TABLET ORAL DAILY
COMMUNITY

## 2023-04-26 ENCOUNTER — TELEPHONE (OUTPATIENT)
Facility: LOCATION | Age: 53
End: 2023-04-26

## 2023-05-03 ENCOUNTER — HOSPITAL ENCOUNTER (OUTPATIENT)
Facility: HOSPITAL | Age: 53
Setting detail: HOSPITAL OUTPATIENT SURGERY
Discharge: HOME | End: 2023-05-03
Attending: STUDENT IN AN ORGANIZED HEALTH CARE EDUCATION/TRAINING PROGRAM | Admitting: STUDENT IN AN ORGANIZED HEALTH CARE EDUCATION/TRAINING PROGRAM
Payer: COMMERCIAL

## 2023-05-03 ENCOUNTER — ANESTHESIA (OUTPATIENT)
Dept: SURGERY | Facility: HOSPITAL | Age: 53
End: 2023-05-03
Payer: COMMERCIAL

## 2023-05-03 ENCOUNTER — ANESTHESIA EVENT (OUTPATIENT)
Dept: SURGERY | Facility: HOSPITAL | Age: 53
End: 2023-05-03
Payer: COMMERCIAL

## 2023-05-03 VITALS
DIASTOLIC BLOOD PRESSURE: 86 MMHG | OXYGEN SATURATION: 100 % | TEMPERATURE: 97 F | RESPIRATION RATE: 20 BRPM | SYSTOLIC BLOOD PRESSURE: 132 MMHG | WEIGHT: 189 LBS | HEIGHT: 72 IN | HEART RATE: 60 BPM | BODY MASS INDEX: 25.6 KG/M2

## 2023-05-03 PROCEDURE — 0D8R3ZZ DIVISION OF ANAL SPHINCTER, PERCUTANEOUS APPROACH: ICD-10-PCS | Performed by: STUDENT IN AN ORGANIZED HEALTH CARE EDUCATION/TRAINING PROGRAM

## 2023-05-03 PROCEDURE — 46080 SPHNCTROTMY ANAL DIV SPHNCTR: CPT | Performed by: STUDENT IN AN ORGANIZED HEALTH CARE EDUCATION/TRAINING PROGRAM

## 2023-05-03 RX ORDER — SODIUM CHLORIDE, SODIUM LACTATE, POTASSIUM CHLORIDE, CALCIUM CHLORIDE 600; 310; 30; 20 MG/100ML; MG/100ML; MG/100ML; MG/100ML
INJECTION, SOLUTION INTRAVENOUS CONTINUOUS
Status: DISCONTINUED | OUTPATIENT
Start: 2023-05-03 | End: 2023-05-03

## 2023-05-03 RX ORDER — HYDROMORPHONE HYDROCHLORIDE 1 MG/ML
0.6 INJECTION, SOLUTION INTRAMUSCULAR; INTRAVENOUS; SUBCUTANEOUS EVERY 5 MIN PRN
Status: DISCONTINUED | OUTPATIENT
Start: 2023-05-03 | End: 2023-05-03

## 2023-05-03 RX ORDER — DEXAMETHASONE SODIUM PHOSPHATE 4 MG/ML
VIAL (ML) INJECTION AS NEEDED
Status: DISCONTINUED | OUTPATIENT
Start: 2023-05-03 | End: 2023-05-03 | Stop reason: SURG

## 2023-05-03 RX ORDER — ACETAMINOPHEN 500 MG
1000 TABLET ORAL ONCE
Status: DISCONTINUED | OUTPATIENT
Start: 2023-05-03 | End: 2023-05-03 | Stop reason: HOSPADM

## 2023-05-03 RX ORDER — LIDOCAINE HYDROCHLORIDE AND EPINEPHRINE 10; 10 MG/ML; UG/ML
INJECTION, SOLUTION INFILTRATION; PERINEURAL AS NEEDED
Status: DISCONTINUED | OUTPATIENT
Start: 2023-05-03 | End: 2023-05-03 | Stop reason: HOSPADM

## 2023-05-03 RX ORDER — CLINDAMYCIN PHOSPHATE 900 MG/50ML
900 INJECTION INTRAVENOUS ONCE
Status: COMPLETED | OUTPATIENT
Start: 2023-05-03 | End: 2023-05-03

## 2023-05-03 RX ORDER — MIDAZOLAM HYDROCHLORIDE 1 MG/ML
1 INJECTION INTRAMUSCULAR; INTRAVENOUS EVERY 5 MIN PRN
Status: DISCONTINUED | OUTPATIENT
Start: 2023-05-03 | End: 2023-05-03

## 2023-05-03 RX ORDER — HEPARIN SODIUM 5000 [USP'U]/ML
5000 INJECTION, SOLUTION INTRAVENOUS; SUBCUTANEOUS ONCE
Status: COMPLETED | OUTPATIENT
Start: 2023-05-03 | End: 2023-05-03

## 2023-05-03 RX ORDER — BUPIVACAINE HYDROCHLORIDE 2.5 MG/ML
INJECTION, SOLUTION EPIDURAL; INFILTRATION; INTRACAUDAL AS NEEDED
Status: DISCONTINUED | OUTPATIENT
Start: 2023-05-03 | End: 2023-05-03 | Stop reason: HOSPADM

## 2023-05-03 RX ORDER — ONDANSETRON 2 MG/ML
4 INJECTION INTRAMUSCULAR; INTRAVENOUS EVERY 6 HOURS PRN
Status: DISCONTINUED | OUTPATIENT
Start: 2023-05-03 | End: 2023-05-03

## 2023-05-03 RX ORDER — NALOXONE HYDROCHLORIDE 0.4 MG/ML
80 INJECTION, SOLUTION INTRAMUSCULAR; INTRAVENOUS; SUBCUTANEOUS AS NEEDED
Status: DISCONTINUED | OUTPATIENT
Start: 2023-05-03 | End: 2023-05-03

## 2023-05-03 RX ORDER — SCOLOPAMINE TRANSDERMAL SYSTEM 1 MG/1
1 PATCH, EXTENDED RELEASE TRANSDERMAL ONCE
Status: DISCONTINUED | OUTPATIENT
Start: 2023-05-03 | End: 2023-05-03 | Stop reason: HOSPADM

## 2023-05-03 RX ORDER — HYDROCODONE BITARTRATE AND ACETAMINOPHEN 5; 325 MG/1; MG/1
2 TABLET ORAL ONCE AS NEEDED
Status: COMPLETED | OUTPATIENT
Start: 2023-05-03 | End: 2023-05-03

## 2023-05-03 RX ORDER — HYDROMORPHONE HYDROCHLORIDE 1 MG/ML
0.2 INJECTION, SOLUTION INTRAMUSCULAR; INTRAVENOUS; SUBCUTANEOUS EVERY 5 MIN PRN
Status: DISCONTINUED | OUTPATIENT
Start: 2023-05-03 | End: 2023-05-03

## 2023-05-03 RX ORDER — KETOROLAC TROMETHAMINE 30 MG/ML
INJECTION, SOLUTION INTRAMUSCULAR; INTRAVENOUS AS NEEDED
Status: DISCONTINUED | OUTPATIENT
Start: 2023-05-03 | End: 2023-05-03 | Stop reason: SURG

## 2023-05-03 RX ORDER — GLYCOPYRROLATE 0.2 MG/ML
INJECTION, SOLUTION INTRAMUSCULAR; INTRAVENOUS AS NEEDED
Status: DISCONTINUED | OUTPATIENT
Start: 2023-05-03 | End: 2023-05-03 | Stop reason: SURG

## 2023-05-03 RX ORDER — NEOSTIGMINE METHYLSULFATE 1 MG/ML
INJECTION, SOLUTION INTRAVENOUS AS NEEDED
Status: DISCONTINUED | OUTPATIENT
Start: 2023-05-03 | End: 2023-05-03 | Stop reason: SURG

## 2023-05-03 RX ORDER — LIDOCAINE HYDROCHLORIDE 10 MG/ML
INJECTION, SOLUTION EPIDURAL; INFILTRATION; INTRACAUDAL; PERINEURAL AS NEEDED
Status: DISCONTINUED | OUTPATIENT
Start: 2023-05-03 | End: 2023-05-03 | Stop reason: SURG

## 2023-05-03 RX ORDER — HYDROMORPHONE HYDROCHLORIDE 1 MG/ML
0.4 INJECTION, SOLUTION INTRAMUSCULAR; INTRAVENOUS; SUBCUTANEOUS EVERY 5 MIN PRN
Status: DISCONTINUED | OUTPATIENT
Start: 2023-05-03 | End: 2023-05-03

## 2023-05-03 RX ORDER — PROCHLORPERAZINE EDISYLATE 5 MG/ML
5 INJECTION INTRAMUSCULAR; INTRAVENOUS EVERY 8 HOURS PRN
Status: DISCONTINUED | OUTPATIENT
Start: 2023-05-03 | End: 2023-05-03

## 2023-05-03 RX ORDER — ACETAMINOPHEN 500 MG
1000 TABLET ORAL ONCE AS NEEDED
Status: COMPLETED | OUTPATIENT
Start: 2023-05-03 | End: 2023-05-03

## 2023-05-03 RX ORDER — ROCURONIUM BROMIDE 10 MG/ML
INJECTION, SOLUTION INTRAVENOUS AS NEEDED
Status: DISCONTINUED | OUTPATIENT
Start: 2023-05-03 | End: 2023-05-03 | Stop reason: SURG

## 2023-05-03 RX ORDER — DIPHENHYDRAMINE HYDROCHLORIDE 50 MG/ML
12.5 INJECTION INTRAMUSCULAR; INTRAVENOUS AS NEEDED
Status: DISCONTINUED | OUTPATIENT
Start: 2023-05-03 | End: 2023-05-03

## 2023-05-03 RX ORDER — ONDANSETRON 2 MG/ML
INJECTION INTRAMUSCULAR; INTRAVENOUS AS NEEDED
Status: DISCONTINUED | OUTPATIENT
Start: 2023-05-03 | End: 2023-05-03 | Stop reason: SURG

## 2023-05-03 RX ORDER — OXYCODONE HYDROCHLORIDE 5 MG/1
5 TABLET ORAL EVERY 6 HOURS PRN
Qty: 30 TABLET | Refills: 0 | Status: SHIPPED | OUTPATIENT
Start: 2023-05-03

## 2023-05-03 RX ORDER — HYDROCODONE BITARTRATE AND ACETAMINOPHEN 5; 325 MG/1; MG/1
1 TABLET ORAL ONCE AS NEEDED
Status: COMPLETED | OUTPATIENT
Start: 2023-05-03 | End: 2023-05-03

## 2023-05-03 RX ORDER — MEPERIDINE HYDROCHLORIDE 25 MG/ML
12.5 INJECTION INTRAMUSCULAR; INTRAVENOUS; SUBCUTANEOUS AS NEEDED
Status: DISCONTINUED | OUTPATIENT
Start: 2023-05-03 | End: 2023-05-03

## 2023-05-03 RX ADMIN — DEXAMETHASONE SODIUM PHOSPHATE 8 MG: 4 MG/ML VIAL (ML) INJECTION at 13:46:00

## 2023-05-03 RX ADMIN — ROCURONIUM BROMIDE 30 MG: 10 INJECTION, SOLUTION INTRAVENOUS at 13:39:00

## 2023-05-03 RX ADMIN — LIDOCAINE HYDROCHLORIDE 80 MG: 10 INJECTION, SOLUTION EPIDURAL; INFILTRATION; INTRACAUDAL; PERINEURAL at 13:39:00

## 2023-05-03 RX ADMIN — GLYCOPYRROLATE 0.6 MG: 0.2 INJECTION, SOLUTION INTRAMUSCULAR; INTRAVENOUS at 14:16:00

## 2023-05-03 RX ADMIN — CLINDAMYCIN PHOSPHATE 900 MG: 900 INJECTION INTRAVENOUS at 13:34:00

## 2023-05-03 RX ADMIN — ONDANSETRON 4 MG: 2 INJECTION INTRAMUSCULAR; INTRAVENOUS at 13:46:00

## 2023-05-03 RX ADMIN — SODIUM CHLORIDE, SODIUM LACTATE, POTASSIUM CHLORIDE, CALCIUM CHLORIDE: 600; 310; 30; 20 INJECTION, SOLUTION INTRAVENOUS at 13:39:00

## 2023-05-03 RX ADMIN — KETOROLAC TROMETHAMINE 30 MG: 30 INJECTION, SOLUTION INTRAMUSCULAR; INTRAVENOUS at 14:16:00

## 2023-05-03 RX ADMIN — SODIUM CHLORIDE, SODIUM LACTATE, POTASSIUM CHLORIDE, CALCIUM CHLORIDE: 600; 310; 30; 20 INJECTION, SOLUTION INTRAVENOUS at 14:16:00

## 2023-05-03 RX ADMIN — NEOSTIGMINE METHYLSULFATE 3 MG: 1 INJECTION, SOLUTION INTRAVENOUS at 14:16:00

## 2023-05-03 NOTE — INTERVAL H&P NOTE
Pre-op Diagnosis: Anal fissure [K60.2]    The above referenced H&P was reviewed by Dov Rutherford MD on 5/3/2023, the patient was examined and no significant changes have occurred in the patient's condition since the H&P was performed. I discussed with the patient and/or legal representative the potential benefits, risks and side effects of this procedure; the likelihood of the patient achieving goals; and potential problems that might occur during recuperation. I discussed reasonable alternatives to the procedure, including risks, benefits and side effects related to the alternatives and risks related to not receiving this procedure. We will proceed with procedure as planned. The risks of anorectal surgery were discussed with the patient and include but are not limited to severe or chronic post-operative pain, hemorrhage, infection or sepsis, incision dehiscence,  incontinence, anal stricture, recurrent disease, need for additional surgical intervention. The patient voiced understanding and agreed to proceed.

## 2023-05-03 NOTE — ANESTHESIA PROCEDURE NOTES
Airway  Date/Time: 5/3/2023 1:40 PM  Urgency: elective      General Information and Staff    Patient location during procedure: OR  Anesthesiologist: Ajay Breaux MD  Performed: anesthesiologist   Performed by: Ajay Breaux MD  Authorized by: Ajay Breaux MD      Indications and Patient Condition  Indications for airway management: anesthesia  Sedation level: deep  Preoxygenated: yes  Patient position: sniffing  Mask difficulty assessment: 1 - vent by mask    Final Airway Details  Final airway type: endotracheal airway      Successful airway: ETT  Cuffed: yes   Successful intubation technique: direct laryngoscopy  Endotracheal tube insertion site: oral  Blade: Pablo  Blade size: #2  ETT size (mm): 7.0    Cormack-Lehane Classification: grade IIA - partial view of glottis  Placement verified by: chest auscultation and capnometry   Measured from: lips  ETT to lips (cm): 23  Number of attempts at approach: 1

## 2023-05-03 NOTE — OPERATIVE REPORT
[facility]  Operative Note    Terri Burn Location: OR   CSN [id number] MRN [id number]    3/1/1970 Age 48year old   Admission Date 5/3/2023 Operation Date 5/3/2023   Attending Physician Walter Reyes MD Operating Physician Brooklyn Vanegas MD   PCP Lucas Erazo DO          Patient Name: Terri Burn    Preoperative Diagnosis: Anal fissure [K60.2]    Postoperative Diagnosis: Anal fissure, internal and external hemorrhoids      Surgeon(s) and Role:     Jp Villegas MD - Primary     * Mihaela Almanza MD - Assisting Surgeon    The assistance of Dr. Oral Barton was absolutely essential to the proper conduct of this case and further assisted with the identification of the internal sphincter and the sphincterotomy. Anesthesia: General    Procedures:  1. Anal exam under anesthesia  2. Lateral internal sphincterotomy      Specimen: none    Drains: none    Estimated Blood Loss: 5 mL     Complications: none    Condition: stable     Indications for Surgery:   Terri Reese is a 48year old who presented to me with complaints of anal pain. 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 He was started on dietary modifications and medical treatment with nifedipine for 8 weeks without any improvement. We discussed proceeding with lateral internal sphincterotomy. The risks of anorectal surgery were discussed with the patient and include but are not limited to severe or chronic post-operative pain, hemorrhage, infection or sepsis, incision dehiscence,  incontinence, anal stricture, recurrent disease, need for additional surgical intervention. The patient voiced understanding and agreed to proceed. Surgical Findings:   The patient had a posterior midline anal fissure and internal hemorrhoids (grade 2) and external hemorrhoids. Description of Procedure:    The patient was transported to the operating room and general endotracheal anesthesia was administered while still on the hospital bed. The patient was then flipped over onto the operating table in the prone position. After adequate padding she was flexed into the jackknife position. The buttocks were taped apart. Preoperative antibiotics were given. The perineum and perianal skin and buttocks were prepped and draped in sterile fashion. A timeout was performed. A digital rectal exam was performed with a lubricated finger. The anus was serially dilated with Hill-Najera retractors. The anal canal was inspected and the findings above were noted. The intersphincteric groove was palpated and identified. 1% lidocaine with epi was injected into the area. A 2 cm incision was made at the intersphincteric grove. Using a Metzenbaum scissors the internal sphincter was dissected away from the anal mucosa and the external sphincter. The internal sphincter fibers were clearly identified and elevated into the wound using a right angle. The internal sphincter fibers were divided to the length of the fissure with electrocautery until the hypertrophic internal sphincter was felt relaxed on digital rectal exam. Hemostasis was achieved and the mucosal incision was closed loosely using interrupted 2-0 vicryl sutures. The operative site was cleansed and dried, sterile dressings were placed, and the patient was returned to the supine position, and taken to recovery room in stable postoperative condition. The patient tolerated the procedure without apparent intra-operative complication. All sponge, instrument, and needle counts were correct at the end of the case.     Lincoln Pinzon MD  5/3/2023  2:27 PM

## 2023-05-18 ENCOUNTER — OFFICE VISIT (OUTPATIENT)
Facility: LOCATION | Age: 53
End: 2023-05-18

## 2023-05-18 VITALS — HEART RATE: 98 BPM | TEMPERATURE: 97 F

## 2023-05-18 DIAGNOSIS — Z98.890 POST-OPERATIVE STATE: ICD-10-CM

## 2023-05-18 DIAGNOSIS — Z48.89 ENCOUNTER FOR POSTOPERATIVE WOUND CHECK: ICD-10-CM

## 2023-05-18 DIAGNOSIS — K60.2 ANAL FISSURE: Primary | ICD-10-CM

## 2023-05-18 PROCEDURE — 99024 POSTOP FOLLOW-UP VISIT: CPT | Performed by: PHYSICIAN ASSISTANT

## 2023-06-01 ENCOUNTER — OFFICE VISIT (OUTPATIENT)
Facility: LOCATION | Age: 53
End: 2023-06-01

## 2023-06-01 VITALS — TEMPERATURE: 97 F | HEART RATE: 94 BPM

## 2023-06-01 DIAGNOSIS — Z98.890 POST-OPERATIVE STATE: Primary | ICD-10-CM

## 2023-06-01 PROCEDURE — 99024 POSTOP FOLLOW-UP VISIT: CPT | Performed by: STUDENT IN AN ORGANIZED HEALTH CARE EDUCATION/TRAINING PROGRAM

## 2023-09-22 ENCOUNTER — OFFICE VISIT (OUTPATIENT)
Dept: INTERNAL MEDICINE CLINIC | Facility: CLINIC | Age: 53
End: 2023-09-22
Payer: COMMERCIAL

## 2023-09-22 VITALS
SYSTOLIC BLOOD PRESSURE: 124 MMHG | TEMPERATURE: 97 F | RESPIRATION RATE: 16 BRPM | HEIGHT: 72 IN | HEART RATE: 63 BPM | OXYGEN SATURATION: 99 % | BODY MASS INDEX: 25.19 KG/M2 | WEIGHT: 186 LBS | DIASTOLIC BLOOD PRESSURE: 66 MMHG

## 2023-09-22 DIAGNOSIS — S46.912A STRAIN OF LEFT SHOULDER, INITIAL ENCOUNTER: Primary | ICD-10-CM

## 2023-09-22 PROCEDURE — 3008F BODY MASS INDEX DOCD: CPT | Performed by: FAMILY MEDICINE

## 2023-09-22 PROCEDURE — 99213 OFFICE O/P EST LOW 20 MIN: CPT | Performed by: FAMILY MEDICINE

## 2023-09-22 PROCEDURE — 3074F SYST BP LT 130 MM HG: CPT | Performed by: FAMILY MEDICINE

## 2023-09-22 PROCEDURE — 3078F DIAST BP <80 MM HG: CPT | Performed by: FAMILY MEDICINE

## 2023-09-22 RX ORDER — NAPROXEN 500 MG/1
500 TABLET ORAL 2 TIMES DAILY WITH MEALS
Qty: 28 TABLET | Refills: 0 | Status: SHIPPED | OUTPATIENT
Start: 2023-09-22 | End: 2023-10-06

## 2023-09-22 RX ORDER — METHOCARBAMOL 750 MG/1
750 TABLET, FILM COATED ORAL NIGHTLY PRN
Qty: 14 TABLET | Refills: 0 | Status: SHIPPED | OUTPATIENT
Start: 2023-09-22

## 2023-09-22 NOTE — PROGRESS NOTES
Subjective:   Patient ID: Avinash Monroe is a 48year old male. HPIHere with c/o left shoulder pain similar to previous episode for which he ultimately did PT in 2021. No clear injury. Pain with certain movements. Has been a couple of months. Hasn't tried anything for this. History/Other:   Review of Systems   Respiratory:  Negative for chest tightness and shortness of breath. Musculoskeletal:  Negative for neck pain and neck stiffness. Current Outpatient Medications   Medication Sig Dispense Refill    naproxen 500 MG Oral Tab Take 1 tablet (500 mg total) by mouth 2 (two) times daily with meals for 14 days. 28 tablet 0    methocarbamol 750 MG Oral Tab Take 1 tablet (750 mg total) by mouth nightly as needed. 14 tablet 0    oxyCODONE 5 MG Oral Tab Take 1 tablet (5 mg total) by mouth every 6 (six) hours as needed for Pain. 30 tablet 0    TOPIRAMATE 25 MG Oral Tab TAKE 1 TABLET BY MOUTH EVERY DAY 90 tablet 3    ATORVASTATIN 40 MG Oral Tab TAKE 1 TABLET(40 MG) BY MOUTH EVERY NIGHT 90 tablet 3    hydrocortisone-pramoxine perianal 1-1 % External Foam Place 1 applicator rectally 2 (two) times daily. 10 g 0    VALACYCLOVIR 1 G Oral Tab TAKE 1 TABLET(1000 MG) BY MOUTH THREE TIMES DAILY FOR 7 DOSES 21 tablet 2    PREVIDENT 5000 PLUS 1.1 % Dental Cream   2    AZELASTINE 0.1 % Nasal Solution USE 1 SPRAY IN EACH NOSTRIL TWICE DAILY (Patient not taking: Reported on 9/22/2023) 90 mL 0     Allergies:  Amoxicillin               Sulfa Antibiotics           Objective:   Physical Exam  Vitals reviewed. Constitutional:       Appearance: Normal appearance. He is well-developed. HENT:      Head: Normocephalic and atraumatic. Pulmonary:      Effort: Pulmonary effort is normal.   Musculoskeletal:      Left shoulder: Tenderness present. No swelling or bony tenderness. Normal range of motion. Arms:    Neurological:      Mental Status: He is alert.    Psychiatric:         Mood and Affect: Mood normal. Behavior: Behavior normal.         Assessment & Plan:   Strain of left shoulder, initial encounter  (primary encounter diagnosis)  Discussed options. Start NSAIDs course and muscle relaxer at night. Discussed risks/benefits/potential side effects and proper use of medication. Relative rest but continue ROM. Will send to Ortho again if persists. No orders of the defined types were placed in this encounter. Meds This Visit:  Requested Prescriptions     Signed Prescriptions Disp Refills    naproxen 500 MG Oral Tab 28 tablet 0     Sig: Take 1 tablet (500 mg total) by mouth 2 (two) times daily with meals for 14 days. methocarbamol 750 MG Oral Tab 14 tablet 0     Sig: Take 1 tablet (750 mg total) by mouth nightly as needed.        Imaging & Referrals:  None

## 2023-10-21 LAB
AMB EXT BILIRUBIN, TOTAL: 0.6 MG/DL
AMB EXT BUN: 20 MG/DL
AMB EXT CALCIUM: 9.7
AMB EXT CHLORIDE: 107
AMB EXT CHOL/HDL RATIO: 2.9
AMB EXT CHOLESTEROL, TOTAL: 130 MG/DL
AMB EXT CMP ALT: 28 U/L
AMB EXT CMP AST: 26 U/L
AMB EXT EGFR NON-AA: 63
AMB EXT GLUCOSE: 102 MG/DL
AMB EXT HDL CHOLESTEROL: 45 MG/DL
AMB EXT HEMATOCRIT: 45.1
AMB EXT HEMOGLOBIN: 14.6
AMB EXT HGBA1C: 5.8 %
AMB EXT LDL CHOLESTEROL, DIRECT: 72 MG/DL
AMB EXT MCV: 92
AMB EXT PLATELETS: 224
AMB EXT POSTASSIUM: 5.3 MMOL/L
AMB EXT SODIUM: 143 MMOL/L
AMB EXT TOTAL PROTEIN: 7
AMB EXT TRIGLYCERIDES: 62 MG/DL
AMB EXT WBC: 1.5 X10(3)UL

## 2023-10-24 ENCOUNTER — TELEPHONE (OUTPATIENT)
Dept: INTERNAL MEDICINE CLINIC | Facility: CLINIC | Age: 53
End: 2023-10-24

## 2023-10-24 NOTE — TELEPHONE ENCOUNTER
LM for pt at  [phone] (M) vm to call back for results and recommendations.  
Patient Lab Results. Abstracted. Placed in AMS basket for review. Will send to scan after.   
Please call patient. We received recent labs results done through employer wellness. His creatinine, potassium, A1c were a little high. Not high enough to need to recheck anything immediately but he should schedule his physical for January (last was 1/20/23) and we will repeat his labs then. In the meantime, limit carbs and sugars.   
Spoke w/ pt. Notified AMS received labs and reviewed them. Creatinine, potassium and A1C slightly elevated but not high enough where we need to have him repeat immediately. Notified that AMS advises pt schedule annual around January (when last one was) and we can repeat labs then. Advised to limit carbs and sugars in the mean time. Offered to schedule annual exam and pt stated he will call back to schedule. Denies further questions. Verbalizes understanding and agreeable to plan.   
None known

## 2023-12-04 RX ORDER — VALACYCLOVIR HYDROCHLORIDE 1 G/1
1000 TABLET, FILM COATED ORAL 3 TIMES DAILY
Qty: 21 TABLET | Refills: 2 | Status: SHIPPED | OUTPATIENT
Start: 2023-12-04

## 2023-12-04 NOTE — TELEPHONE ENCOUNTER
Requested Prescriptions     Pending Prescriptions Disp Refills    VALACYCLOVIR 1 G Oral Tab [Pharmacy Med Name: VALACYCLOVIR 1GM TABLETS] 21 tablet 2     Sig: TAKE 1 TABLET(1000 MG) BY MOUTH THREE TIMES DAILY FOR 7 DOSES       LOV:9/22/23 AMS     LAST CPE:1/20/23 AMS    Last Labs:10/21/23 cmp,lipid,aic,cbc    Last Refill:6/9/22 ( 21 tab, 2 refills)

## 2024-01-15 ENCOUNTER — OFFICE VISIT (OUTPATIENT)
Dept: INTERNAL MEDICINE CLINIC | Facility: CLINIC | Age: 54
End: 2024-01-15
Payer: COMMERCIAL

## 2024-01-15 ENCOUNTER — LAB ENCOUNTER (OUTPATIENT)
Dept: LAB | Age: 54
End: 2024-01-15
Attending: FAMILY MEDICINE
Payer: COMMERCIAL

## 2024-01-15 VITALS
HEIGHT: 72 IN | BODY MASS INDEX: 25.14 KG/M2 | HEART RATE: 52 BPM | SYSTOLIC BLOOD PRESSURE: 110 MMHG | DIASTOLIC BLOOD PRESSURE: 68 MMHG | WEIGHT: 185.63 LBS | OXYGEN SATURATION: 99 %

## 2024-01-15 DIAGNOSIS — Z12.5 SCREENING PSA (PROSTATE SPECIFIC ANTIGEN): ICD-10-CM

## 2024-01-15 DIAGNOSIS — R79.89 ELEVATED SERUM CREATININE: ICD-10-CM

## 2024-01-15 DIAGNOSIS — R73.01 IMPAIRED FASTING GLUCOSE: ICD-10-CM

## 2024-01-15 DIAGNOSIS — Z00.00 ANNUAL PHYSICAL EXAM: Primary | ICD-10-CM

## 2024-01-15 LAB
ALBUMIN SERPL-MCNC: 4.2 G/DL (ref 3.4–5)
ALBUMIN/GLOB SERPL: 1.4 {RATIO} (ref 1–2)
ALP LIVER SERPL-CCNC: 62 U/L
ALT SERPL-CCNC: 37 U/L
ANION GAP SERPL CALC-SCNC: 3 MMOL/L (ref 0–18)
AST SERPL-CCNC: 27 U/L (ref 15–37)
BILIRUB SERPL-MCNC: 0.8 MG/DL (ref 0.1–2)
BUN BLD-MCNC: 20 MG/DL (ref 9–23)
CALCIUM BLD-MCNC: 9.4 MG/DL (ref 8.5–10.1)
CHLORIDE SERPL-SCNC: 111 MMOL/L (ref 98–112)
CO2 SERPL-SCNC: 27 MMOL/L (ref 21–32)
COMPLEXED PSA SERPL-MCNC: 2.14 NG/ML (ref ?–4)
CREAT BLD-MCNC: 1.18 MG/DL
EGFRCR SERPLBLD CKD-EPI 2021: 74 ML/MIN/1.73M2 (ref 60–?)
EST. AVERAGE GLUCOSE BLD GHB EST-MCNC: 120 MG/DL (ref 68–126)
FASTING STATUS PATIENT QL REPORTED: NO
GLOBULIN PLAS-MCNC: 3 G/DL (ref 2.8–4.4)
GLUCOSE BLD-MCNC: 82 MG/DL (ref 70–99)
HBA1C MFR BLD: 5.8 % (ref ?–5.7)
OSMOLALITY SERPL CALC.SUM OF ELEC: 294 MOSM/KG (ref 275–295)
POTASSIUM SERPL-SCNC: 5.1 MMOL/L (ref 3.5–5.1)
PROT SERPL-MCNC: 7.2 G/DL (ref 6.4–8.2)
SODIUM SERPL-SCNC: 141 MMOL/L (ref 136–145)

## 2024-01-15 PROCEDURE — 3078F DIAST BP <80 MM HG: CPT | Performed by: FAMILY MEDICINE

## 2024-01-15 PROCEDURE — 99396 PREV VISIT EST AGE 40-64: CPT | Performed by: FAMILY MEDICINE

## 2024-01-15 PROCEDURE — 3008F BODY MASS INDEX DOCD: CPT | Performed by: FAMILY MEDICINE

## 2024-01-15 PROCEDURE — 83036 HEMOGLOBIN GLYCOSYLATED A1C: CPT

## 2024-01-15 PROCEDURE — 3074F SYST BP LT 130 MM HG: CPT | Performed by: FAMILY MEDICINE

## 2024-01-15 PROCEDURE — 80053 COMPREHEN METABOLIC PANEL: CPT

## 2024-01-15 NOTE — PROGRESS NOTES
Subjective:   Patient ID: Koby Atkins is a 53 year old male.    HPI Here for annual check-up. Patient is exercising regularly. No complaints.     History/Other:   Past Medical History:   Diagnosis Date    Anal fissure     Bloating Maybe a year ago    Flatulence/gas pain/belching Maybe a year ago    Headache disorder Over 20 years    Suffer migraine headaches every so often since college.    Hemorrhoids     High cholesterol     Hyperthyroidism 12/30/2015    Migraines     Other and unspecified hyperlipidemia     Visual impairment     glasses    Wears glasses 1999     Past Surgical History:   Procedure Laterality Date    COLONOSCOPY  At least 10 years ago.    LASIK      10 yrs ago    TONSILLECTOMY       Social History     Socioeconomic History    Marital status:    Tobacco Use    Smoking status: Never    Smokeless tobacco: Never   Vaping Use    Vaping Use: Never used   Substance and Sexual Activity    Alcohol use: No     Alcohol/week: 0.0 standard drinks of alcohol    Drug use: No     Family History   Problem Relation Age of Onset    Heart Disease Maternal Grandfather     Prostate Cancer Maternal Grandfather         89 years old.  Passed away last year.    Colon Cancer Brother         46 years old. Recently diagnosed this year.       Review of Systems   Constitutional:  Negative for chills, fatigue and fever.   HENT:  Negative for hearing loss and sore throat.    Eyes:  Negative for pain and visual disturbance.   Respiratory:  Negative for choking, shortness of breath and wheezing.    Cardiovascular:  Negative for chest pain, palpitations and leg swelling.   Gastrointestinal:  Negative for abdominal pain, constipation, diarrhea, nausea and vomiting.   Endocrine: Negative for polydipsia, polyphagia and polyuria.   Genitourinary:  Negative for dysuria.   Musculoskeletal:  Negative for arthralgias and joint swelling.   Skin:  Negative for rash.   Neurological:  Negative for dizziness, weakness,  light-headedness, numbness and headaches.   Hematological:  Negative for adenopathy. Does not bruise/bleed easily.   Psychiatric/Behavioral:  Negative for dysphoric mood. The patient is not nervous/anxious.      Current Outpatient Medications   Medication Sig Dispense Refill    valACYclovir 1 G Oral Tab Take 1 tablet (1,000 mg total) by mouth 3 (three) times daily. 21 tablet 2    methocarbamol 750 MG Oral Tab Take 1 tablet (750 mg total) by mouth nightly as needed. 14 tablet 0    oxyCODONE 5 MG Oral Tab Take 1 tablet (5 mg total) by mouth every 6 (six) hours as needed for Pain. 30 tablet 0    TOPIRAMATE 25 MG Oral Tab TAKE 1 TABLET BY MOUTH EVERY DAY 90 tablet 3    ATORVASTATIN 40 MG Oral Tab TAKE 1 TABLET(40 MG) BY MOUTH EVERY NIGHT 90 tablet 3    hydrocortisone-pramoxine perianal 1-1 % External Foam Place 1 applicator rectally 2 (two) times daily. 10 g 0    PREVIDENT 5000 PLUS 1.1 % Dental Cream   2     Allergies:  Allergies   Allergen Reactions    Amoxicillin     Sulfa Antibiotics        Objective:   Physical Exam  Vitals reviewed.   Constitutional:       Appearance: Normal appearance. He is well-developed.   HENT:      Head: Normocephalic and atraumatic.      Right Ear: Tympanic membrane, ear canal and external ear normal.      Left Ear: Tympanic membrane, ear canal and external ear normal.      Mouth/Throat:      Pharynx: No posterior oropharyngeal erythema.   Eyes:      Conjunctiva/sclera: Conjunctivae normal.   Cardiovascular:      Rate and Rhythm: Normal rate and regular rhythm.      Heart sounds: Normal heart sounds.   Pulmonary:      Effort: Pulmonary effort is normal.      Breath sounds: Normal breath sounds.   Musculoskeletal:      Cervical back: Normal range of motion and neck supple.   Skin:     General: Skin is warm and dry.   Neurological:      Mental Status: He is alert and oriented to person, place, and time.   Psychiatric:         Behavior: Behavior normal.         Assessment & Plan:   1. Annual  physical exam    2. Impaired fasting glucose    3. Elevated serum creatinine    4. Screening PSA (prostate specific antigen)    1-3. Reviewed age-appropriate preventive health and safety recommendations with patient. Reviewed lab results from 10/23. Encouraged regular exercise and healthy eating. Recheck labs out of range.   4. Check lab.      Orders Placed This Encounter   Procedures    Comp Metabolic Panel (14)    Hemoglobin A1C    PSA, Total (Screening) [E]       Meds This Visit:  Requested Prescriptions      No prescriptions requested or ordered in this encounter       Imaging & Referrals:  None

## 2024-02-05 ENCOUNTER — OFFICE VISIT (OUTPATIENT)
Dept: INTERNAL MEDICINE CLINIC | Facility: CLINIC | Age: 54
End: 2024-02-05
Payer: COMMERCIAL

## 2024-02-05 VITALS
SYSTOLIC BLOOD PRESSURE: 118 MMHG | OXYGEN SATURATION: 99 % | DIASTOLIC BLOOD PRESSURE: 74 MMHG | WEIGHT: 185 LBS | HEIGHT: 72 IN | HEART RATE: 53 BPM | BODY MASS INDEX: 25.06 KG/M2

## 2024-02-05 DIAGNOSIS — M54.16 LUMBAR RADICULOPATHY: Primary | ICD-10-CM

## 2024-02-05 PROCEDURE — 3078F DIAST BP <80 MM HG: CPT | Performed by: FAMILY MEDICINE

## 2024-02-05 PROCEDURE — 3074F SYST BP LT 130 MM HG: CPT | Performed by: FAMILY MEDICINE

## 2024-02-05 PROCEDURE — 99214 OFFICE O/P EST MOD 30 MIN: CPT | Performed by: FAMILY MEDICINE

## 2024-02-05 PROCEDURE — 3008F BODY MASS INDEX DOCD: CPT | Performed by: FAMILY MEDICINE

## 2024-02-05 RX ORDER — METHOCARBAMOL 750 MG/1
750 TABLET, FILM COATED ORAL 3 TIMES DAILY PRN
Qty: 20 TABLET | Refills: 0 | Status: SHIPPED | OUTPATIENT
Start: 2024-02-05

## 2024-02-05 RX ORDER — METHYLPREDNISOLONE 4 MG/1
TABLET ORAL
Qty: 1 EACH | Refills: 0 | Status: SHIPPED | OUTPATIENT
Start: 2024-02-05

## 2024-02-06 RX ORDER — ATORVASTATIN CALCIUM 40 MG/1
40 TABLET, FILM COATED ORAL NIGHTLY
Qty: 90 TABLET | Refills: 3 | Status: SHIPPED | OUTPATIENT
Start: 2024-02-06

## 2024-02-06 NOTE — PROGRESS NOTES
Subjective:   Patient ID: Koby Atkins is a 53 year old male.     HPI Here with c/o about one month of right sided low back pain that has been more persistent over the past 2 weeks. Pain is radiating down side/back of right leg into his right ankle. Achy and shooting pain. More pain when laying down. No numbness/weakness. Has stopped exercising because of this.     History/Other:   Past Medical History:   Diagnosis Date    Anal fissure     Bloating Maybe a year ago    Flatulence/gas pain/belching Maybe a year ago    Headache disorder Over 20 years    Suffer migraine headaches every so often since college.    Hemorrhoids     High cholesterol     Hyperthyroidism 12/30/2015    Migraines     Other and unspecified hyperlipidemia     Visual impairment     glasses    Wears glasses 1999       Review of Systems   Constitutional:  Negative for chills and fever.   Musculoskeletal:  Positive for back pain. Negative for neck pain.   Neurological:  Negative for weakness and numbness.     Current Outpatient Medications   Medication Sig Dispense Refill    methylPREDNISolone (MEDROL) 4 MG Oral Tablet Therapy Pack As directed. 1 each 0    methocarbamol 750 MG Oral Tab Take 1 tablet (750 mg total) by mouth 3 (three) times daily as needed. 20 tablet 0    valACYclovir 1 G Oral Tab Take 1 tablet (1,000 mg total) by mouth 3 (three) times daily. 21 tablet 2    TOPIRAMATE 25 MG Oral Tab TAKE 1 TABLET BY MOUTH EVERY DAY 90 tablet 3    ATORVASTATIN 40 MG Oral Tab TAKE 1 TABLET(40 MG) BY MOUTH EVERY NIGHT 90 tablet 3    hydrocortisone-pramoxine perianal 1-1 % External Foam Place 1 applicator rectally 2 (two) times daily. 10 g 0    PREVIDENT 5000 PLUS 1.1 % Dental Cream   2     Allergies:  Allergies   Allergen Reactions    Amoxicillin     Sulfa Antibiotics        Objective:   Physical Exam  Vitals reviewed.   Constitutional:       Appearance: Normal appearance. He is well-developed.   HENT:      Head: Normocephalic and atraumatic.    Pulmonary:      Effort: Pulmonary effort is normal.   Musculoskeletal:      Lumbar back: Tenderness present. Positive right straight leg raise test.        Back:    Skin:     General: Skin is warm and dry.   Neurological:      Mental Status: He is alert.      Sensory: No sensory deficit.      Motor: Motor function is intact.      Gait: Gait is intact.   Psychiatric:         Mood and Affect: Mood normal.         Behavior: Behavior normal.         Assessment & Plan:   1. Lumbar radiculopathy    Start Rx steroid and muscle relaxer. Discussed risks/benefits/potential side effects and proper use of medication.   Activity as tolerated. Will plan PT vs Ortho if persists in 2 weeks.   No orders of the defined types were placed in this encounter.      Meds This Visit:  Requested Prescriptions     Signed Prescriptions Disp Refills    methylPREDNISolone (MEDROL) 4 MG Oral Tablet Therapy Pack 1 each 0     Sig: As directed.    methocarbamol 750 MG Oral Tab 20 tablet 0     Sig: Take 1 tablet (750 mg total) by mouth 3 (three) times daily as needed.       Imaging & Referrals:  None

## 2024-02-06 NOTE — TELEPHONE ENCOUNTER
Requested Prescriptions     Pending Prescriptions Disp Refills    ATORVASTATIN 40 MG Oral Tab [Pharmacy Med Name: ATORVASTATIN 40MG TABLETS] 90 tablet 3     Sig: TAKE 1 TABLET(40 MG) BY MOUTH EVERY NIGHT       LOV:2/5/24 AMS    LAST CPE:1/15/24 AMS    Last Labs:1/15/24 psa, A1c, cmp    Last Refill:  Medication Quantity Refills Start End   ATORVASTATIN 40 MG Oral Tab 90 tablet 3 1/27/2023 --   Sig:   TAKE 1 TABLET(40 MG) BY MOUTH EVERY NIGHT     Route:   (none)     Order #:   [id number]

## 2024-02-19 ENCOUNTER — TELEPHONE (OUTPATIENT)
Dept: ORTHOPEDICS CLINIC | Facility: CLINIC | Age: 54
End: 2024-02-19

## 2024-02-19 ENCOUNTER — TELEPHONE (OUTPATIENT)
Dept: INTERNAL MEDICINE CLINIC | Facility: CLINIC | Age: 54
End: 2024-02-19

## 2024-02-19 DIAGNOSIS — M54.16 LUMBAR RADICULOPATHY: Primary | ICD-10-CM

## 2024-02-19 DIAGNOSIS — M54.50 LOW BACK PAIN, UNSPECIFIED BACK PAIN LATERALITY, UNSPECIFIED CHRONICITY, UNSPECIFIED WHETHER SCIATICA PRESENT: Primary | ICD-10-CM

## 2024-02-19 NOTE — TELEPHONE ENCOUNTER
Pt stated he was in to see AMS on 2/5 and still has discomfort on right side.  Pt requesting a nurse to call him back.  Pt stated AMS to follow up and advise.

## 2024-02-19 NOTE — TELEPHONE ENCOUNTER
XR ordered per ortho protocol. XR scheduled and patient was notified via [employer] to let them know that they should arrive 15-20 minutes early, in order for them to complete imaging.

## 2024-02-19 NOTE — TELEPHONE ENCOUNTER
Called and spoke to pt. Per pt, R lower back pain down R leg continues. Not worsening, but not improving either. Did not improve with steroid and muscle relaxer prescribed at LOV.     PT vs ortho mentioned in OV note. Routing to AMS for review and recs on next steps.

## 2024-02-19 NOTE — TELEPHONE ENCOUNTER
Ortho referral placed.     Called and spoke w/ pt. Notified AMS recommends pt see ortho. Notified referral is in place. Provided pt with contact information. Pt stated he will call and schedule. Pt appreciative of call.

## 2024-02-23 ENCOUNTER — HOSPITAL ENCOUNTER (OUTPATIENT)
Dept: GENERAL RADIOLOGY | Age: 54
Discharge: HOME | End: 2024-02-23
Attending: STUDENT IN AN ORGANIZED HEALTH CARE EDUCATION/TRAINING PROGRAM
Payer: COMMERCIAL

## 2024-02-23 ENCOUNTER — OFFICE VISIT (OUTPATIENT)
Dept: ORTHOPEDICS CLINIC | Facility: CLINIC | Age: 54
End: 2024-02-23
Payer: COMMERCIAL

## 2024-02-23 DIAGNOSIS — M54.16 LUMBAR RADICULITIS: Primary | ICD-10-CM

## 2024-02-23 DIAGNOSIS — M54.50 LOW BACK PAIN, UNSPECIFIED BACK PAIN LATERALITY, UNSPECIFIED CHRONICITY, UNSPECIFIED WHETHER SCIATICA PRESENT: ICD-10-CM

## 2024-02-23 PROCEDURE — 72100 X-RAY EXAM L-S SPINE 2/3 VWS: CPT | Performed by: STUDENT IN AN ORGANIZED HEALTH CARE EDUCATION/TRAINING PROGRAM

## 2024-02-23 PROCEDURE — 99204 OFFICE O/P NEW MOD 45 MIN: CPT | Performed by: STUDENT IN AN ORGANIZED HEALTH CARE EDUCATION/TRAINING PROGRAM

## 2024-02-23 RX ORDER — MELOXICAM 15 MG/1
15 TABLET ORAL DAILY
Qty: 30 TABLET | Refills: 0 | Status: SHIPPED | OUTPATIENT
Start: 2024-02-23

## 2024-02-23 NOTE — H&P
[facility] - ORTHOPEDICS  [address], [address], [place] [address]  [address] [place] [id number]  [phone]     NEW PATIENT VISIT - HISTORY AND PHYSICAL EXAMINATION     Name: Koby Atkins   MRN: [id number]  Date: 02/23/24       CC: back and right leg pain    REFERRED BY: Evita Otoole DO    HPI:   Koby Atkins is a very pleasant 53 year old male who presents today for evaluation of back and leg pain. The distribution of symptoms are: 20% backpain and 80% leg pain. The symptoms began 4 week(s) ago without any significant injury. Since the onset, the symptoms have become slowly worse over time. Patient feels pain is aggravated by standing and improved by sitting. The patient reports no numbness and no weakness.  The symptom characteristics are as follows: Patient is a 53-year-old male presenting with back pain radiating down right lower extremity.  Pain is severe.  Patient was started on oral steroids 2 weeks ago without significant relief.  Patient is unable to sleep at night and cannot perform his duties at work due to the symptoms.     Prior spine surgery: none.    Bowel and bladder symptoms: absent.    The patient has not had issues with balance and/or hand dexterity problems such as changes in penmanship or the use of buttons or zippers.    Treatment up to this time has included:    Evaluation: PCP  NSAIDS: have not been prescribed  Narcotic use: None  Physical therapy: None  Spinal injections: None  Others:       PMH:   Past Medical History:   Diagnosis Date    Anal fissure     Bloating Maybe a year ago    Flatulence/gas pain/belching Maybe a year ago    Headache disorder Over 20 years    Suffer migraine headaches every so often since college.    Hemorrhoids     High cholesterol     Hyperthyroidism 12/30/2015    Migraines     Other and unspecified hyperlipidemia     Visual impairment     glasses    Wears glasses 1999       PAST SURGICAL HX:  Past  Surgical History:   Procedure Laterality Date    COLONOSCOPY  At least 10 years ago.    LASIK      10 yrs ago    TONSILLECTOMY         FAMILY HX:  Family History   Problem Relation Age of Onset    Heart Disease Maternal Grandfather     Prostate Cancer Maternal Grandfather         89 years old.  Passed away last year.    Colon Cancer Brother         46 years old. Recently diagnosed this year.       ALLERGIES:  Amoxicillin and Sulfa antibiotics    MEDICATIONS:   Current Outpatient Medications   Medication Sig Dispense Refill    Meloxicam 15 MG Oral Tab Take 1 tablet (15 mg total) by mouth daily. 30 tablet 0    atorvastatin 40 MG Oral Tab Take 1 tablet (40 mg total) by mouth nightly. 90 tablet 3    methylPREDNISolone (MEDROL) 4 MG Oral Tablet Therapy Pack As directed. 1 each 0    methocarbamol 750 MG Oral Tab Take 1 tablet (750 mg total) by mouth 3 (three) times daily as needed. 20 tablet 0    PREVIDENT 5000 PLUS 1.1 % Dental Cream   2    valACYclovir 1 G Oral Tab Take 1 tablet (1,000 mg total) by mouth 3 (three) times daily. (Patient not taking: Reported on 2/23/2024) 21 tablet 2    TOPIRAMATE 25 MG Oral Tab TAKE 1 TABLET BY MOUTH EVERY DAY 90 tablet 3    hydrocortisone-pramoxine perianal 1-1 % External Foam Place 1 applicator rectally 2 (two) times daily. 10 g 0       ROS: A comprehensive 14 point review of systems was performed and was negative aside from the aforementioned per history of present illness.    SOCIAL HX:  Social History     Tobacco Use    Smoking status: Never    Smokeless tobacco: Never   Substance Use Topics    Alcohol use: No     Alcohol/week: 0.0 standard drinks of alcohol         PE:   There were no vitals filed for this visit.  Estimated body mass index is 25.09 kg/m² as calculated from the following:    Height as of 2/5/24: 6' (1.829 m).    Weight as of 2/5/24: 185 lb (83.9 kg).    Physical Exam  Constitutional:       Appearance: Normal appearance.   HENT:      Head: Normocephalic and  atraumatic.   Eyes:      Extraocular Movements: Extraocular movements intact.   Cardiovascular:      Pulses: Normal pulses. Skin warm and well perfused.  Pulmonary:      Effort: Pulmonary effort is normal. No respiratory distress.   Skin:     General: Skin is warm.   Psychiatric:         Mood and Affect: Mood normal.     Spine Exam:    Normal gait without difficulty  Able to heel, toe, tandem gait without difficulty  Level shoulders and hips in even stance    Restricted L-spine ROM    No tenderness to palpation of L-spine    Straight leg raise test: raise    Sustained clonus: negative    LE Strength: 5/5 IP QUAD TA EHL GSC  LE Sensation: normal in L2-S1 distribution  LE reflexes: normal    Radiographic Examination/Diagnostics:  XR personally viewed, independently interpreted and radiology report was reviewed.  X-ray of the lumbar spine demonstrates mild degenerative changes      IMPRESSION: Koby Atkins is a 53 year old male with likely lumbar disc herniation and radiculitis    PLAN:     We reviewed the patients history, symptoms, exam findings, and imaging today.  We had a detailed discussion outlining the etiology, anatomy, pathophysiology, and natural history of  lumbar disc herniation. The typical management of this condition may include lifestyle modification, NSAIDs, physical therapy, oral steroids, epidural injections, neuromodulatory medications, and sometimes pain medications.   - Due to severity of patient's of symptoms, and inability to perform activities of daily living and work, recommend MRI to further guide treatment  - Prescribed Meloxicam  - Referred patient to PT    FOLLOW-UP:  We will see him back in follow-up in after updated images, or sooner if any problems arise. Patient understands and agrees with plan.      Ghanshyam Cano MD  Orthopedic Spine Surgeon  [facility]   [address] [place] [address], [address] [place] [address] [id number] [place] [id number]   [email or web address]   [email or web address]  t: [phone]   f: [phone]        This note was dictated using Dragon software.  While it was briefly proofread prior to completion, some grammatical, spelling, and word choice errors due to dictation may still occur.

## 2024-02-26 ENCOUNTER — TELEPHONE (OUTPATIENT)
Dept: ORTHOPEDICS CLINIC | Facility: CLINIC | Age: 54
End: 2024-02-26

## 2024-02-26 DIAGNOSIS — M54.16 LUMBAR RADICULITIS: Primary | ICD-10-CM

## 2024-02-26 NOTE — TELEPHONE ENCOUNTER
Patient would like a new mri order for insight- patient doesn't want to wait a couple of weeks for edward- please call patient when orderd

## 2024-04-01 ENCOUNTER — MED REC SCAN ONLY (OUTPATIENT)
Dept: ORTHOPEDICS CLINIC | Facility: CLINIC | Age: 54
End: 2024-04-01

## 2024-04-01 ENCOUNTER — TELEPHONE (OUTPATIENT)
Dept: ORTHOPEDICS CLINIC | Facility: CLINIC | Age: 54
End: 2024-04-01

## 2024-04-12 RX ORDER — TOPIRAMATE 25 MG/1
25 TABLET ORAL DAILY
Qty: 90 TABLET | Refills: 3 | Status: SHIPPED | OUTPATIENT
Start: 2024-04-12

## 2024-04-12 NOTE — TELEPHONE ENCOUNTER
Requested Prescriptions     Pending Prescriptions Disp Refills    TOPIRAMATE 25 MG Oral Tab [Pharmacy Med Name: TOPIRAMATE 25MG TABLETS] 90 tablet 3     Sig: TAKE 1 TABLET BY MOUTH EVERY DAY       LOV:2/5/24 AMS    LAST CPE:1/15/24 AMS    Last Labs:1/15/24 PSA,A1C,CMP    Last Refill:  Medication Quantity Refills Start End   TOPIRAMATE 25 MG Oral Tab 90 tablet 3 3/21/2023 --   Sig:   TAKE 1 TABLET BY MOUTH EVERY DAY     Route:   (none)     Order #:   [id number]           
[FreeTextEntry8] : This is a 73-year-old female past medical history of hypertension, osteopenia, type 2 diabetes, lumbago, diverticulosis, presenting to the office complaining of numbness of the front RIGHT leg from the groin to the knee, described at times as crawling sensation, she denies any trauma, states that at times she feels hot or cold sensation. Pt states that she has had it on off for over one year but the last month has been constant worsened the last 2 days. Pt denies any weakness of the leg.

## 2024-04-24 ENCOUNTER — TELEPHONE (OUTPATIENT)
Dept: INTERNAL MEDICINE CLINIC | Facility: CLINIC | Age: 54
End: 2024-04-24

## 2024-04-24 NOTE — TELEPHONE ENCOUNTER
We received medical record request from [employer] for date of service 4/23/21 to 4/23/24-sent to [employer] and copy sent to scanning

## 2024-05-08 ENCOUNTER — TELEPHONE (OUTPATIENT)
Dept: ORTHOPEDICS CLINIC | Facility: CLINIC | Age: 54
End: 2024-05-08

## 2024-05-08 NOTE — TELEPHONE ENCOUNTER
Received 4 pgs fax from [employer] requesting patient's all records from 04/25/2021 to present, faxed and sent original to [employer] for completion.

## 2024-05-10 ENCOUNTER — OFFICE VISIT (OUTPATIENT)
Dept: ORTHOPEDICS CLINIC | Facility: CLINIC | Age: 54
End: 2024-05-10
Payer: COMMERCIAL

## 2024-05-10 VITALS — HEIGHT: 72 IN | WEIGHT: 185 LBS | BODY MASS INDEX: 25.06 KG/M2

## 2024-05-10 DIAGNOSIS — M54.16 LUMBAR RADICULITIS: Primary | ICD-10-CM

## 2024-05-10 PROCEDURE — 99213 OFFICE O/P EST LOW 20 MIN: CPT | Performed by: STUDENT IN AN ORGANIZED HEALTH CARE EDUCATION/TRAINING PROGRAM

## 2024-05-10 PROCEDURE — 3008F BODY MASS INDEX DOCD: CPT | Performed by: STUDENT IN AN ORGANIZED HEALTH CARE EDUCATION/TRAINING PROGRAM

## 2024-05-10 NOTE — PROGRESS NOTES
[facility] - ORTHOPEDICS  [address], [address] [address]  [address] [place] [address]  [phone]     FOLLOW-UP PATIENT VISIT    Name: Koby Atkins   MRN: [id number]  Date: 5/10/2024     CC: back and right leg pain      INTERVAL HISTORY:   Koby Atkins is a 54 year old male  follow-up patient whom I have been treating conservatively. Patient returns today for reevaluation of back and leg pain.     Patient is a 53-year-old male presenting with back pain radiating down right lower extremity.  Pain started in February and was refractory to pain medications.  Seen in February referred to physical therapy along with additional anti-inflammatories.  MRI completed    Symptoms have significantly improved.    Bowel and bladder symptoms: absent.    We have tried the following interventions thus far: PT, NSAIDs    ROS: No fever/chills or other constitutional issues.    PE:   Vitals:    05/10/24 0844   Weight: 185 lb (83.9 kg)   Height: 6' (1.829 m)     Estimated body mass index is 25.09 kg/m² as calculated from the following:    Height as of this encounter: 6' (1.829 m).    Weight as of this encounter: 185 lb (83.9 kg).    On physical examination, he is awake, alert and oriented x 3 and in no acute distress. Mood, affect and language are normal. He appears well developed and well nourished.  He walks without a nonantalgic, nonmyelopathic, non-Trendelenburg gait. Motor strength testing of the lower extremities shows 5/5 strength in hip flexors, knee extensors, ankle dorsiflexors, toe extensor, and gastroc-soleus complex.   Sensation is intact to light touch L2-S1 distributions bilaterally. Reflexes normal.     Radiographic Examination/Diagnostics:  MRI personally viewed, independently interpreted and radiology report was reviewed.  MRI of the lumbar spine demonstrates right paracentral disc herniation at L4-5    IMPRESSION: Koby Atkins is a 54 year old  male with lumbar disc herniation, responding well to medical treatment    PLAN:   We had a detailed discussion outlining the etiology, anatomy, pathophysiology, and natural history of lumbar disc herniation.  - Continue activities as tolerated and home exercises    FOLLOW-UP:  We will see him back in follow-up as needed    I spent 20 minutes in preparation to see the patient, counseling/education of relevant pathology, discussing imaging results, ordering medication/therapy intervention, and care coordination.        Ghanshyam Cano MD  Orthopedic Spine Surgeon  [facility]   [address] [place] [address].[email or web address]  t: [phone]   f: [phone]        This note was dictated using Dragon software.  While it was briefly proofread prior to completion, some grammatical, spelling, and word choice errors due to dictation may still occur.

## 2024-07-14 ENCOUNTER — OFFICE VISIT (OUTPATIENT)
Dept: FAMILY MEDICINE CLINIC | Facility: CLINIC | Age: 54
End: 2024-07-14
Payer: COMMERCIAL

## 2024-07-14 VITALS
RESPIRATION RATE: 16 BRPM | TEMPERATURE: 98 F | OXYGEN SATURATION: 100 % | HEIGHT: 72 IN | WEIGHT: 190 LBS | SYSTOLIC BLOOD PRESSURE: 114 MMHG | HEART RATE: 86 BPM | BODY MASS INDEX: 25.73 KG/M2 | DIASTOLIC BLOOD PRESSURE: 84 MMHG

## 2024-07-14 DIAGNOSIS — J02.9 SORE THROAT: Primary | ICD-10-CM

## 2024-07-14 DIAGNOSIS — J06.9 VIRAL URI WITH COUGH: ICD-10-CM

## 2024-07-14 DIAGNOSIS — H66.002 NON-RECURRENT ACUTE SUPPURATIVE OTITIS MEDIA OF LEFT EAR WITHOUT SPONTANEOUS RUPTURE OF TYMPANIC MEMBRANE: ICD-10-CM

## 2024-07-14 PROCEDURE — 3079F DIAST BP 80-89 MM HG: CPT | Performed by: NURSE PRACTITIONER

## 2024-07-14 PROCEDURE — 99213 OFFICE O/P EST LOW 20 MIN: CPT | Performed by: NURSE PRACTITIONER

## 2024-07-14 PROCEDURE — 3074F SYST BP LT 130 MM HG: CPT | Performed by: NURSE PRACTITIONER

## 2024-07-14 PROCEDURE — 3008F BODY MASS INDEX DOCD: CPT | Performed by: NURSE PRACTITIONER

## 2024-07-14 RX ORDER — CEPHALEXIN 500 MG/1
500 CAPSULE ORAL 2 TIMES DAILY
Qty: 14 CAPSULE | Refills: 0 | Status: SHIPPED | OUTPATIENT
Start: 2024-07-14 | End: 2024-07-21

## 2024-07-14 NOTE — PROGRESS NOTES
CHIEF COMPLAINT:     Chief Complaint   Patient presents with    Cold     2 days, head congestion, ear pain, cough, runny nose, sore throat, denies fever, OTC ibuprofen, sudafed, dayquil        HPI:   Koby Atkins is a 54 year old male who presents for upper respiratory symptoms for  2 days. Patient reports sore throat, congestion, cough with clear colored sputum, ear pain. Reports could not sleep last night due to throat pain.  Took Ibuprofen with minimal relief.  + chills.  +frontal headaches.  Reports just returned via flight from being in [place].  Symptoms have been worsening since onset.  Treating symptoms with otc meds.   Associated symptoms include fatigue.    Current Outpatient Medications   Medication Sig Dispense Refill    cephalexin (KEFLEX) 500 MG Oral Cap Take 1 capsule (500 mg total) by mouth 2 (two) times daily for 7 days. 14 capsule 0    topiramate 25 MG Oral Tab Take 1 tablet (25 mg total) by mouth daily. 90 tablet 3    atorvastatin 40 MG Oral Tab Take 1 tablet (40 mg total) by mouth nightly. 90 tablet 3    Meloxicam 15 MG Oral Tab Take 1 tablet (15 mg total) by mouth daily. 30 tablet 0    methylPREDNISolone (MEDROL) 4 MG Oral Tablet Therapy Pack As directed. 1 each 0    methocarbamol 750 MG Oral Tab Take 1 tablet (750 mg total) by mouth 3 (three) times daily as needed. 20 tablet 0    valACYclovir 1 G Oral Tab Take 1 tablet (1,000 mg total) by mouth 3 (three) times daily. (Patient not taking: Reported on 2/23/2024) 21 tablet 2    hydrocortisone-pramoxine perianal 1-1 % External Foam Place 1 applicator rectally 2 (two) times daily. 10 g 0    PREVIDENT 5000 PLUS 1.1 % Dental Cream   2      Past Medical History:    Anal fissure    Bloating    Flatulence/gas pain/belching    Headache disorder    Suffer migraine headaches every so often since college.    Hemorrhoids    High cholesterol    Hyperthyroidism    Migraines    Other and unspecified hyperlipidemia    Visual impairment    glasses    Wears  glasses      Past Surgical History:   Procedure Laterality Date    Colonoscopy  At least 10 years ago.    Lasik      10 yrs ago    Tonsillectomy           Social History     Socioeconomic History    Marital status:    Tobacco Use    Smoking status: Never    Smokeless tobacco: Never   Vaping Use    Vaping status: Never Used   Substance and Sexual Activity    Alcohol use: No     Alcohol/week: 0.0 standard drinks of alcohol    Drug use: No         REVIEW OF SYSTEMS:   GENERAL: feels well otherwise,   decreased appetite  SKIN: no rashes or abnormal skin lesions  HEENT: See HPI  LUNGS: denies shortness of breath or wheezing, See HPI  CARDIOVASCULAR: denies chest pain or palpitations   GI: denies N/V/C or abdominal pain  NEURO: Denies headaches    EXAM:   /84   Pulse 86   Temp 98 °F (36.7 °C)   Resp 16   Ht 6' (1.829 m)   Wt 190 lb (86.2 kg)   SpO2 100%   BMI 25.77 kg/m²   GENERAL: well developed, well nourished,in no apparent distress  SKIN: no rashes,no suspicious lesions  HEAD: atraumatic, normocephalic.  no tenderness on palpation of maxillary or frontal sinuses  EYES: conjunctiva clear, EOM intact  EARS: Right TM  dull, no bulging, no retraction,+ opaque fluid, bony landmarks partially visualized. Right TM injected, + bulging, no retraction,+aneudy fluid, bony landmarks not visualized2  NOSE: Nostrils patent, clear nasal discharge, nasal mucosa pink and moist  THROAT: Oral mucosa pink, moist. Posterior pharynx is mildly erythematous. no exudates. Tonsils surgically absent/4.    NECK: Supple, non-tender  LUNGS: clear to auscultation bilaterally, no wheezes or rhonchi.  No crackles/rales, good air movement throughout. Breathing is non labored.  CARDIO: RRR without murmur  EXTREMITIES: no cyanosis, clubbing or edema  LYMPH:  No cervical lymphadenopathy.        ASSESSMENT AND PLAN:   Koby Atkins is a 54 year old male who presents with     ASSESSMENT:   Encounter Diagnoses   Name Primary?    Sore  throat Yes    Viral URI with cough     Non-recurrent acute suppurative otitis media of left ear without spontaneous rupture of tympanic membrane        PLAN: Meds as below.  Mucinex to help thin secretions.  Recommend Covid test, pt. Reports can do at ome.   Discussed viral vs bacterial etiology of URIs, including pharyngitis, laryngitis, bronchitis and sinus congestion/pain. Patient was informed that antibiotics are not effective for treating viral ailments and can result in antibiotic resistence. Reviewed symptom relief measures with patient. Patient is  amenable to symptom relief measures.  Follow up with PCP if no improvement in 3-5 days, sooner if worsening.  If any sob/wheezing seek emergent care.Comfort care as described in Patient Instructions    Meds & Refills for this Visit:  Requested Prescriptions     Signed Prescriptions Disp Refills    cephalexin (KEFLEX) 500 MG Oral Cap 14 capsule 0     Sig: Take 1 capsule (500 mg total) by mouth 2 (two) times daily for 7 days.       Risks, benefits, and side effects of medication explained and discussed.    There are no Patient Instructions on file for this visit.    The patient indicates understanding of these issues and agrees to the plan.  The patient is asked to return if sx's persist or worsen.

## 2024-09-28 LAB
AMB EXT BILIRUBIN, TOTAL: 0.6 MG/DL
AMB EXT BUN: 21 MG/DL
AMB EXT CHOL/HDL RATIO: 3
AMB EXT CHOLESTEROL, TOTAL: 118 MG/DL
AMB EXT CMP ALT: 26 U/L
AMB EXT CMP AST: 24 U/L
AMB EXT CREATININE: 1.29 MG/DL
AMB EXT EGFR NON-AA: 66
AMB EXT GLUCOSE: 89 MG/DL
AMB EXT GLUCOSE: 89 MG/DL
AMB EXT HDL CHOLESTEROL: 39 MG/DL
AMB EXT HEMATOCRIT: 44.6
AMB EXT HEMOGLOBIN: 14.2
AMB EXT HGBA1C: 5.7 %
AMB EXT LDL CHOLESTEROL, DIRECT: 67 MG/DL
AMB EXT MCV: 94
AMB EXT POSTASSIUM: 4.5 MMOL/L
AMB EXT PSA SCREEN: 1.8 NG/ML
AMB EXT SODIUM: 140 MMOL/L
AMB EXT TOTAL PROTEIN: 6.6
AMB EXT TRIGLYCERIDES: 54 MG/DL
AMB EXT TSH: 3.03 MIU/ML
AMB EXT WBC: 6.6 X10(3)UL

## 2024-10-01 ENCOUNTER — TELEPHONE (OUTPATIENT)
Dept: INTERNAL MEDICINE CLINIC | Facility: CLINIC | Age: 54
End: 2024-10-01

## 2024-10-02 NOTE — TELEPHONE ENCOUNTER
Please call patient. We received his recent lab results done through his employer wellness. His A1c is a little better so continue to limit carbs and sugars. HDL is a little low so make sure to continue to exercise regularly and to get 30-35 grams of fiber per day.

## 2024-10-03 NOTE — TELEPHONE ENCOUNTER
Called patient with results and recommendations. He states he has been taking a daily fiber supplement (Metamucil) for about 6 months and exercises regularly and his HDL still went down from previous. He is wondering if there is anything else he can do to improve HDL.

## 2024-10-21 ENCOUNTER — OFFICE VISIT (OUTPATIENT)
Dept: INTERNAL MEDICINE CLINIC | Facility: CLINIC | Age: 54
End: 2024-10-21
Payer: COMMERCIAL

## 2024-10-21 VITALS
HEIGHT: 72 IN | SYSTOLIC BLOOD PRESSURE: 104 MMHG | DIASTOLIC BLOOD PRESSURE: 60 MMHG | OXYGEN SATURATION: 100 % | WEIGHT: 185.44 LBS | HEART RATE: 57 BPM | BODY MASS INDEX: 25.12 KG/M2

## 2024-10-21 DIAGNOSIS — S46.911A STRAIN OF RIGHT SHOULDER, INITIAL ENCOUNTER: Primary | ICD-10-CM

## 2024-10-21 PROCEDURE — 3078F DIAST BP <80 MM HG: CPT | Performed by: FAMILY MEDICINE

## 2024-10-21 PROCEDURE — 99214 OFFICE O/P EST MOD 30 MIN: CPT | Performed by: FAMILY MEDICINE

## 2024-10-21 PROCEDURE — 3008F BODY MASS INDEX DOCD: CPT | Performed by: FAMILY MEDICINE

## 2024-10-21 PROCEDURE — 3074F SYST BP LT 130 MM HG: CPT | Performed by: FAMILY MEDICINE

## 2024-10-22 NOTE — PROGRESS NOTES
Subjective:   Patient ID: Koby Atkins is a 54 year old male.    HPI Here with one month of right shoulder pain. Reached back to turn alarm off while in bed one morning and since then has had pain with certain movements. Has taken Aleve and this helps temporarily.     History/Other:   Past Medical History:    Anal fissure    Bloating    Flatulence/gas pain/belching    Headache disorder    Suffer migraine headaches every so often since college.    Hemorrhoids    High cholesterol    Hyperthyroidism    Migraines    Other and unspecified hyperlipidemia    Visual impairment    glasses    Wears glasses       Review of Systems   Cardiovascular:  Negative for chest pain and palpitations.   Musculoskeletal:  Negative for neck pain and neck stiffness.   Neurological:  Negative for weakness and numbness.     Current Outpatient Medications   Medication Sig Dispense Refill    topiramate 25 MG Oral Tab Take 1 tablet (25 mg total) by mouth daily. 90 tablet 3    atorvastatin 40 MG Oral Tab Take 1 tablet (40 mg total) by mouth nightly. 90 tablet 3    methocarbamol 750 MG Oral Tab Take 1 tablet (750 mg total) by mouth 3 (three) times daily as needed. 20 tablet 0     Allergies:Allergies[1]    Objective:   Physical Exam  Vitals reviewed.   Constitutional:       Appearance: Normal appearance. He is well-developed.   HENT:      Head: Normocephalic and atraumatic.   Pulmonary:      Effort: Pulmonary effort is normal.   Musculoskeletal:      Right shoulder: No swelling. Decreased range of motion (abduction, internal rotation). Normal pulse.   Neurological:      Mental Status: He is alert.   Psychiatric:         Mood and Affect: Mood normal.         Behavior: Behavior normal.         Assessment & Plan:   1. Strain of right shoulder, initial encounter    Discussed possible causes. Increase Aleve to 2 tabs PO BID with food. PT order placed.     No orders of the defined types were placed in this encounter.      Meds This Visit:  Requested  Prescriptions      No prescriptions requested or ordered in this encounter       Imaging & Referrals:  PHYSICAL THERAPY EXTERNAL         [1]   Allergies  Allergen Reactions    Amoxicillin OTHER (SEE COMMENTS)     Stomach problems    Sulfa Antibiotics

## 2024-10-28 ENCOUNTER — TELEPHONE (OUTPATIENT)
Dept: INTERNAL MEDICINE CLINIC | Facility: CLINIC | Age: 54
End: 2024-10-28

## 2024-11-13 ENCOUNTER — PATIENT MESSAGE (OUTPATIENT)
Dept: INTERNAL MEDICINE CLINIC | Facility: CLINIC | Age: 54
End: 2024-11-13

## 2024-11-15 ENCOUNTER — TELEPHONE (OUTPATIENT)
Dept: ORTHOPEDICS CLINIC | Facility: CLINIC | Age: 54
End: 2024-11-15

## 2024-11-15 DIAGNOSIS — M25.511 RIGHT SHOULDER PAIN, UNSPECIFIED CHRONICITY: Primary | ICD-10-CM

## 2024-11-15 NOTE — TELEPHONE ENCOUNTER
Patient called for right shoulder pain. Please advise for xrays   Future Appointments   Date Time Provider Department Center   11/19/2024  7:50 AM [email or web address]

## 2024-11-19 ENCOUNTER — HOSPITAL ENCOUNTER (OUTPATIENT)
Dept: GENERAL RADIOLOGY | Age: 54
Discharge: HOME | End: 2024-11-19
Attending: PHYSICIAN ASSISTANT
Payer: COMMERCIAL

## 2024-11-19 ENCOUNTER — OFFICE VISIT (OUTPATIENT)
Dept: ORTHOPEDICS CLINIC | Facility: CLINIC | Age: 54
End: 2024-11-19
Payer: COMMERCIAL

## 2024-11-19 VITALS — BODY MASS INDEX: 25.06 KG/M2 | WEIGHT: 185 LBS | HEIGHT: 72 IN

## 2024-11-19 DIAGNOSIS — S46.001A INJURY OF RIGHT ROTATOR CUFF, INITIAL ENCOUNTER: Primary | ICD-10-CM

## 2024-11-19 DIAGNOSIS — M25.511 RIGHT SHOULDER PAIN, UNSPECIFIED CHRONICITY: ICD-10-CM

## 2024-11-19 PROCEDURE — 73030 X-RAY EXAM OF SHOULDER: CPT | Performed by: PHYSICIAN ASSISTANT

## 2024-11-19 PROCEDURE — 3008F BODY MASS INDEX DOCD: CPT | Performed by: PHYSICIAN ASSISTANT

## 2024-11-19 PROCEDURE — 99214 OFFICE O/P EST MOD 30 MIN: CPT | Performed by: PHYSICIAN ASSISTANT

## 2024-11-19 NOTE — H&P
[facility] - ORTHOPEDICS  [address] [place] [address]  [phone]     NEW PATIENT VISIT - HISTORY AND PHYSICAL EXAMINATION     Name: Koby Atkins   MRN: [id number]  Date: 11/19/2024     CC: Right shoulder pain.    REFERRED BY: Evita Otoole DO    HPI:   Koby Atkins is a very pleasant 54 year old right-hand dominant male who presents today for evaluation, consultation, and management of right shoulder pain after reaching for an alarm clock, and felt significant pain and a \"pull\" - a few months ago. His pain has persisted to weakness, and instability. Pain is a 4/10. He works as an .     He has completed 4 weeks of physical therapy at [facility] without improvement.     PMH:   Past Medical History:    Anal fissure    Bloating    Flatulence/gas pain/belching    Headache disorder    Suffer migraine headaches every so often since college.    Hemorrhoids    High cholesterol    Hyperthyroidism    Migraines    Other and unspecified hyperlipidemia    Visual impairment    glasses    Wears glasses       PAST SURGICAL HX:  Past Surgical History:   Procedure Laterality Date    Colonoscopy  At least 10 years ago.    Lasik      10 yrs ago    Tonsillectomy      Vasectomy         FAMILY HX:  Family History   Problem Relation Age of Onset    Heart Disease Maternal Grandfather     Prostate Cancer Maternal Grandfather         89 years old.  Passed away last year.    Colon Cancer Brother         46 years old. Recently diagnosed this year.    Cancer Brother        ALLERGIES:  Amoxicillin and Sulfa antibiotics    MEDICATIONS:   Current Outpatient Medications   Medication Sig Dispense Refill    topiramate 25 MG Oral Tab Take 1 tablet (25 mg total) by mouth daily. 90 tablet 3    atorvastatin 40 MG Oral Tab Take 1 tablet (40 mg total) by mouth nightly. 90 tablet 3    methocarbamol 750 MG Oral Tab Take 1 tablet (750 mg total) by mouth 3 (three) times daily as needed. (Patient not taking:  Reported on 11/19/2024) 20 tablet 0       ROS: A comprehensive 14 point review of systems was performed and was negative aside from the aforementioned per history of present illness.    SOCIAL HX:  Social History     Occupational History    Not on file   Tobacco Use    Smoking status: Never    Smokeless tobacco: Never   Vaping Use    Vaping status: Never Used   Substance and Sexual Activity    Alcohol use: No    Drug use: No    Sexual activity: Not on file        PE:   Vitals:    11/19/24 0752   Weight: 185 lb (83.9 kg)   Height: 6' (1.829 m)     Estimated body mass index is 25.09 kg/m² as calculated from the following:    Height as of this encounter: 6' (1.829 m).    Weight as of this encounter: 185 lb (83.9 kg).    Physical Exam  Constitutional:       Appearance: Normal appearance.   HENT:      Head: Normocephalic and atraumatic.   Eyes:      Extraocular Movements: Extraocular movements intact.   Neck:      Musculoskeletal: Normal range of motion and neck supple.   Cardiovascular:      Pulses: Normal pulses.   Pulmonary:      Effort: Pulmonary effort is normal. No respiratory distress.   Abdominal:      General: There is no distension.   Skin:     General: Skin is warm.      Capillary Refill: Capillary refill takes less than 2 seconds.      Findings: No bruising.   Neurological:      General: No focal deficit present.      Mental Status: Alert.   Psychiatric:         Mood and Affect: Mood normal.     Examination of the right shoulder demonstrates:     Skin is intact, warm and dry.   Cervical:  Full ROM  Spurling's  Negative    Deformity:   none  Atrophy:   none    Scapular winging: Negative    Palpation:     AC Joint  Negative  Biceps Tendon  Negative  Greater Tuberosity Negative    ROM:   Forward Flexion:  full and symmetric  Abduction:   full and symmetric  External Rotation:  full and symmetric  Internal Rotation:  full and symmetric    Rotator Cuff Strength:   Supraspinatus:   5/5  Subscapularis:    4+-5-  Infraspinatus/Teres: 5/5    Provocative Tests:   Damon:   Negative  Speed's:   Negative  [place]'s:   Negative  Lift-off:   Positive  Apprehension:  Negative  Sulcus Sign:   Negative    Neurovascular Upper Extremity (Bilateral)  Motor:    5/5 EPL, Finger Abduction, , Pinch, Deltoid  Sensation:   intact to light touch median, ulnar, radial and axillary nerve  Circulation:   Normal, 2+ radial pulse    The contralateral upper extremity is without limitation in range of motion or strength, no positive provocative maneuvers.     Radiographic Examination/Diagnostics:    I personally viewed, independently interpreted and radiology report was reviewed.    X-ray 11/19/2024, Right Shoulder: No evidence of fracture, foreign body or soft tissue injury.     IMPRESSION: Koby Atkins is a 54 year old Right hand dominant male with right shoulder pain concerning for subscap tear.     PLAN:   We had a detailed discussion outlining the etiology, anatomy, pathophysiology, and natural history of the patient's findings. Imaging was reviewed in detail and correlated to a 3-dimensional model of the patient's pathology.     We reviewed the treatment of this disease condition.     In light of the chronicity of symptoms, loss of normal function, and  failure to progress conservatively we recommend an MRI to evaluate the integrity of the patient's right rotator cuff tendon. The patient will follow up after imaging.   Differential diagnosis includes but not limited to: rotator cuff/labral pathology, impingement, tendinopathy, cartilage injury/loose body, bone marrow edema, and osteoarthritis.     External records were also reviewed for pertinent historical findings contributing to the patients undiagnosed new problem with uncertain prognosis.     The patient had the opportunity to ask questions, and all questions were answered appropriately.         FOLLOW-UP:   Return to clinic following completion of MRI to review scan and  findings.           MAGNOLIA Frank, PAKaseyC Orthopedic Surgery / Sports Medicine Specialist  [facility]  [address] [place] [address] [id number] [place] [address].org  [email or web address]  t: [phone]  o: [phone]  f: [phone]    This note was dictated using Dragon software.  While it was briefly proofread prior to completion, some grammatical, spelling, and word choice errors due to dictation may still occur.

## 2024-12-06 ENCOUNTER — OFFICE VISIT (OUTPATIENT)
Dept: ORTHOPEDICS CLINIC | Facility: CLINIC | Age: 54
End: 2024-12-06
Payer: COMMERCIAL

## 2024-12-06 DIAGNOSIS — M75.41 ROTATOR CUFF IMPINGEMENT SYNDROME OF RIGHT SHOULDER: Primary | ICD-10-CM

## 2024-12-06 PROCEDURE — 20610 DRAIN/INJ JOINT/BURSA W/O US: CPT | Performed by: PHYSICIAN ASSISTANT

## 2024-12-06 PROCEDURE — 99213 OFFICE O/P EST LOW 20 MIN: CPT | Performed by: PHYSICIAN ASSISTANT

## 2024-12-06 RX ORDER — KETOROLAC TROMETHAMINE 30 MG/ML
30 INJECTION, SOLUTION INTRAMUSCULAR; INTRAVENOUS ONCE
Status: COMPLETED | OUTPATIENT
Start: 2024-12-06 | End: 2024-12-06

## 2024-12-06 RX ORDER — TRIAMCINOLONE ACETONIDE 40 MG/ML
40 INJECTION, SUSPENSION INTRA-ARTICULAR; INTRAMUSCULAR ONCE
Status: COMPLETED | OUTPATIENT
Start: 2024-12-06 | End: 2024-12-06

## 2024-12-06 RX ADMIN — KETOROLAC TROMETHAMINE 30 MG: 30 INJECTION, SOLUTION INTRAMUSCULAR; INTRAVENOUS at 09:50:00

## 2024-12-06 RX ADMIN — TRIAMCINOLONE ACETONIDE 40 MG: 40 INJECTION, SUSPENSION INTRA-ARTICULAR; INTRAMUSCULAR at 09:50:00

## 2024-12-06 NOTE — PROCEDURES
Right Shoulder Glenohumeral Joint Injection    Name: Koby Atkins   MRN: [id number]  Date: 12/6/2024     Clinical Indications:   Subacromial impingement with symptoms refractory to conservative measures.     After informed consent, the injection site was marked, sterilized with topical chlorhexidine antiseptic, and locally anesthetized with skin refrigerant.    The patient was seated upright and the shoulder was exposed. Using sterile technique: 1 mL of 30mg/mL of Ketorolac, 2 mL of 0.5% Bupivicaine, 2 mL of 1% Lidocaine, and 1 mL of 40 mg/ml Triamcinolonewas injected with a Anterior approach utilizing a 21 gauge needle.  A band-aid was applied.  The patient tolerated the procedure well.    Disposition:   Proceed with physical therapy and return for repeat evaluation in 6 weeks. No imaging required at next visit.           Alivia Schwartz [facility], PA-C Orthopedic Surgery / Sports Medicine Specialist  [facility]  [address] [place] [address].org  [email or web address]  t: [phone]  o: [phone]  f: [phone]    This note was dictated using Dragon software.  While it was briefly proofread prior to completion, some grammatical, spelling, and word choice errors due to dictation may still occur.

## 2024-12-06 NOTE — PROGRESS NOTES
[facility] - ORTHOPEDICS  [address] [place] [address]  [phone]       Name: Koby Atkins   MRN: [id number]  Date: 12/6/2024     REASON FOR VISIT: Follow up for right shoulder pain.     INTERVAL HISTORY:  Koby Atkins is a 54 year old male who returns for evaluation of right shoulder pain.     To summarize,  right shoulder pain after reaching for an alarm clock, and felt significant pain and a \"pull\" - a few months ago. His pain has persisted to weakness, and instability. Pain is a 4/10. He works as an .      He has completed 4 weeks of physical therapy at [facility] without improvement.     At the patient's last visit we recommended an MRI.       ROS: ROS    PE:   There were no vitals filed for this visit.  Estimated body mass index is 25.09 kg/m² as calculated from the following:    Height as of 11/19/24: 6' (1.829 m).    Weight as of 11/19/24: 185 lb (83.9 kg).    Physical Exam  Constitutional:       Appearance: Normal appearance.   HENT:      Head: Normocephalic and atraumatic.   Eyes:      Extraocular Movements: Extraocular movements intact.   Neck:      Musculoskeletal: Normal range of motion and neck supple.   Cardiovascular:      Pulses: Normal pulses.   Pulmonary:      Effort: Pulmonary effort is normal. No respiratory distress.   Abdominal:      General: There is no distension.   Skin:     General: Skin is warm.      Capillary Refill: Capillary refill takes less than 2 seconds.      Findings: No bruising.   Neurological:      General: No focal deficit present.      Mental Status: She is alert.   Psychiatric:         Mood and Affect: Mood normal.          Examination of the right shoulder demonstrates:      Skin is intact, warm and dry.   Cervical:  Full ROM  Spurling's                           Negative     Deformity:                         none  Atrophy:                             none    Scapular winging:Negative     Palpation:                             AC Joint                              Negative  Biceps Tendon                  Negative  Greater Tuberosity          Negative     ROM:   Forward Flexion:              full and symmetric  Abduction:                         full and symmetric  External Rotation:           full and symmetric  Internal Rotation:            full and symmetric     Rotator Cuff Strength:   Supraspinatus:                  5/5  Subscapularis:                   4+-5-  Infraspinatus/Teres:        5/5     Provocative Tests:   Damon:                            Negative  Speed's:                             Negative  [place]'s:                           Negative  Lift-off:                 Positive  Apprehension:                  Negative  Sulcus Sign:                        Negative     Neurovascular Upper Extremity (Bilateral)  Motor:                                5/5 EPL, Finger Abduction, , Pinch, Deltoid  Sensation:                          intact to light touch median, ulnar, radial and axillary nerve  Circulation:                        Normal, 2+ radial pulse     The contralateral upper extremity is without limitation in range of motion or strength, no positive provocative maneuvers.     Radiographic Examination/Diagnostics:    I personally viewed, independently interpreted and radiology report was reviewed.    MRI SHOULDER, RIGHT (CPT=73221)    Result Date: 11/27/2024  Exam: MRI SHOULDER RT W/O CONTRAST CPT Code(s): 13845 - MRI JOINT UPR EXTREM W/O DYE MRI RIGHT SHOULDER HISTORY: Unspecified injury of muscle(s) and tendon(s) of the rotator cuff of right shoulder, initial encounter COMPARISON:  None currently available. TECHNIQUE: Routine MRI exam performed on a wide bore ultra high field 3.0 T Siemens Skyra MR scanner, without intravenous contrast. FINDINGS: Small joint effusion. No abnormal fluid in subacromial subdeltoid bursa. Mild AC joint arthropathy. Glenohumeral joint demonstrates no focal cartilage loss and no evidence  for interruption of the subchondral plate. Subcortical cyst formation the posterior lateral aspect of the humeral head. Supraspinatus tendinopathy with superficial bursal sided fraying. Infraspinatus and teres minor tendons intact. Mild tendinopathy of the subscapularis tendon. No full-thickness rotator cuff tear. Rotator cuff tendinopathy is of normal signal and morphology. Tendinopathy of the intra-articular segment the long head of biceps tendon. The tendon remains intact and appropriately seated in the bicipital groove. Degenerative signal in the superior labrum with no fluid-filled labral tear. IMPRESSION: 1.Supraspinatus mild tendinopathy with superficial bursal sided fraying. 2.Subscapularis mild tendinopathy. 3.Long head of biceps mild intra-articular tendinopathy. 4.Mild AC joint arthropathy. 5.Degenerative superior labrum. Interpreting Radiologist: Jesus Stover M.D. Electronically Signed: 11/27/2024 07:39 AM    XR SHOULDER, COMPLETE (MIN 2 VIEWS), RIGHT (CPT=73030)    Result Date: 11/19/2024  PROCEDURE:  XR SHOULDER, COMPLETE (MIN 2 VIEWS), RIGHT (CPT=73030)  TECHNIQUE:  Multiple views were obtained.  COMPARISON:  None.  INDICATIONS:  M25.511 Right shoulder pain, unspecified chronicity  PATIENT STATED HISTORY: (As transcribed by Technologist)  Ortho evaluation. Patient stated a few months ago he was laying on his left side in bed and went to reach behind him with his right arm to turn off his alarm and pulled his right shoulder. Patient  states his right shoulder will lock up on him with certain movements.   FINDINGS:  Negative for fracture, dislocation, deformity or other acute bony abnormality. Osteoarthritic changes are noted, mild degree.  No soft tissue abnormalities.             CONCLUSION:  Mild osteoarthritic changes are present. No acute fracture or other acute bony process.   LOCATION:  [facility]   Dictated by (CST): Ej Nance MD on 11/19/2024 at 8:23 AM     Finalized by (MERNA): Lee Ann  MD Ej on 11/19/2024 at 8:23 AM         IMPRESSION: Koby Atkins is a 54 year old male who presented for follow up of right shoulder rotator cuff impingement and tendinopathy.     PLAN:   We had a detailed discussion outlining the etiology, anatomy, pathophysiology, and natural history of the patient's findings.    We reviewed the treatment of this disease condition.  Fortunately, treatment is amenable to conservative treatment which we chose to optimize at today's visit.      After a discussion of a variety of conservative treatment options we elected to proceed with the injection procedure at today's visit. We discussed the risk and benefits of the procedure, including, but not limited to: infection, injury to blood vessels, nerve injury, prolonged pain, swelling, site soreness, failure to progress, and need for advanced treatments.  The patient voiced understanding and agreed to proceed with the treatment plan.     We recommended physical therapy to aid in strengthening, range of motion, functional improvement, and return to baseline activity.  The patient had opportunity to ask questions and all questions were answered appropriately.    FOLLOW-UP:  Return to clinic in six weeks. No imaging required at next visit.             Alivia Schwartz [facility], PA-C Orthopedic Surgery / Sports Medicine Specialist  [facility]  [address] [place] [address].org  [email or web address]  t: [phone]  o: [phone]  f: [phone]    This note was dictated using Dragon software.  While it was briefly proofread prior to completion, some grammatical, spelling, and word choice errors due to dictation may still occur.

## 2024-12-23 ENCOUNTER — TELEPHONE (OUTPATIENT)
Dept: INTERNAL MEDICINE CLINIC | Facility: CLINIC | Age: 54
End: 2024-12-23

## 2025-01-24 ENCOUNTER — OFFICE VISIT (OUTPATIENT)
Dept: INTERNAL MEDICINE CLINIC | Facility: CLINIC | Age: 55
End: 2025-01-24
Payer: COMMERCIAL

## 2025-01-24 ENCOUNTER — MED REC SCAN ONLY (OUTPATIENT)
Dept: INTERNAL MEDICINE CLINIC | Facility: CLINIC | Age: 55
End: 2025-01-24

## 2025-01-24 VITALS
OXYGEN SATURATION: 97 % | DIASTOLIC BLOOD PRESSURE: 62 MMHG | HEIGHT: 72 IN | WEIGHT: 180 LBS | HEART RATE: 64 BPM | BODY MASS INDEX: 24.38 KG/M2 | RESPIRATION RATE: 18 BRPM | SYSTOLIC BLOOD PRESSURE: 100 MMHG

## 2025-01-24 DIAGNOSIS — E78.5 HYPERLIPIDEMIA, UNSPECIFIED HYPERLIPIDEMIA TYPE: ICD-10-CM

## 2025-01-24 DIAGNOSIS — R73.02 IMPAIRED GLUCOSE TOLERANCE: ICD-10-CM

## 2025-01-24 DIAGNOSIS — Z00.00 ANNUAL PHYSICAL EXAM: Primary | ICD-10-CM

## 2025-01-24 RX ORDER — SODIUM FLUORIDE 5 MG/ML
PASTE, DENTIFRICE DENTAL
COMMUNITY
Start: 2024-12-10

## 2025-01-24 NOTE — PROGRESS NOTES
Subjective:   Patient ID: Koby Atkins is a 54 year old male.    HPI Here for annual check-up. Patient is taking statin as prescribed with no issues. Exercises regularly.     History/Other:   Past Medical History:    Anal fissure    Bloating    Flatulence/gas pain/belching    Headache disorder    Suffer migraine headaches every so often since college.    Hemorrhoids    High cholesterol    Hyperthyroidism    Migraines    Other and unspecified hyperlipidemia    Visual impairment    glasses    Wears glasses     Past Surgical History:   Procedure Laterality Date    Colonoscopy  At least 10 years ago.    Lasik      10 yrs ago    Tonsillectomy      Vasectomy       Social History     Socioeconomic History    Marital status:    Tobacco Use    Smoking status: Never    Smokeless tobacco: Never   Vaping Use    Vaping status: Never Used   Substance and Sexual Activity    Alcohol use: No    Drug use: No   Other Topics Concern    Caffeine Concern No    Exercise Yes    Seat Belt Yes    Special Diet No    Stress Concern Yes     Comment: Stress at work    Weight Concern No     Social Drivers of Health     Food Insecurity: No Food Insecurity (1/24/2025)    NCSS - Food Insecurity     Worried About Running Out of Food in the Last Year: No     Ran Out of Food in the Last Year: No   Transportation Needs: No Transportation Needs (1/24/2025)    NCSS - Transportation     Lack of Transportation: No   Housing Stability: Not At Risk (1/24/2025)    NCSS - Housing/Utilities     Has Housing: Yes     Worried About Losing Housing: No     Unable to Get Utilities: No     Family History   Problem Relation Age of Onset    Heart Disease Maternal Grandfather     Prostate Cancer Maternal Grandfather         89 years old.  Passed away in 2019.    Colon Cancer Brother         He was diagnosed when he was 46. He is now 51 and is cancer free.    Cancer Brother        Review of Systems   Constitutional:  Negative for chills, fatigue and fever.    HENT:  Negative for hearing loss and sore throat.    Eyes:  Negative for pain and visual disturbance.   Respiratory:  Negative for choking, shortness of breath and wheezing.    Cardiovascular:  Negative for chest pain, palpitations and leg swelling.   Gastrointestinal:  Negative for abdominal pain, constipation, diarrhea, nausea and vomiting.   Endocrine: Negative for polydipsia, polyphagia and polyuria.   Genitourinary:  Negative for dysuria.   Musculoskeletal:  Negative for arthralgias and joint swelling.   Skin:  Negative for rash.   Neurological:  Negative for dizziness, weakness, light-headedness, numbness and headaches.   Hematological:  Negative for adenopathy. Does not bruise/bleed easily.   Psychiatric/Behavioral:  Negative for dysphoric mood. The patient is not nervous/anxious.      Current Outpatient Medications   Medication Sig Dispense Refill    PREVIDENT 5000 PLUS 1.1 % Dental Cream BRUSH TWICE DAILY AND EXPECTORATE      topiramate 25 MG Oral Tab Take 1 tablet (25 mg total) by mouth daily. 90 tablet 3    atorvastatin 40 MG Oral Tab Take 1 tablet (40 mg total) by mouth nightly. 90 tablet 3     Allergies:Allergies[1]    Objective:   Physical Exam  Vitals reviewed.   Constitutional:       Appearance: Normal appearance. He is well-developed.   HENT:      Head: Normocephalic and atraumatic.      Right Ear: Tympanic membrane, ear canal and external ear normal.      Left Ear: Tympanic membrane, ear canal and external ear normal.      Mouth/Throat:      Pharynx: No posterior oropharyngeal erythema.   Eyes:      Conjunctiva/sclera: Conjunctivae normal.   Cardiovascular:      Rate and Rhythm: Normal rate and regular rhythm.      Heart sounds: Normal heart sounds.   Pulmonary:      Effort: Pulmonary effort is normal.      Breath sounds: Normal breath sounds.   Musculoskeletal:      Cervical back: Normal range of motion and neck supple.   Skin:     General: Skin is warm and dry.   Neurological:      Mental  Status: He is alert and oriented to person, place, and time.   Psychiatric:         Behavior: Behavior normal.         Assessment & Plan:   1. Annual physical exam    2. Impaired glucose tolerance    3. Hyperlipidemia, unspecified hyperlipidemia type    Reviewed age-appropriate preventive health and safety recommendations with patient. Reviewed lab results. Encouraged regular exercise and healthy eating.   Limit carbs and sugars.   Controlled on statin. Continue.     Orders Placed This Encounter   Procedures    Prevnar 20 (PCV20) [04863]       Meds This Visit:  Requested Prescriptions      No prescriptions requested or ordered in this encounter       Imaging & Referrals:  PCV20 VACCINE FOR INTRAMUSCULAR USE         [1]   Allergies  Allergen Reactions    Amoxicillin OTHER (SEE COMMENTS)     Stomach problems    Sulfa Antibiotics

## 2025-01-31 PROBLEM — Z12.11 SPECIAL SCREENING FOR MALIGNANT NEOPLASMS, COLON: Status: ACTIVE | Noted: 2025-01-31

## 2025-01-31 PROBLEM — D12.4 BENIGN NEOPLASM OF DESCENDING COLON: Status: ACTIVE | Noted: 2025-01-31

## 2025-02-05 RX ORDER — TOPIRAMATE 25 MG/1
25 TABLET, FILM COATED ORAL DAILY
Qty: 90 TABLET | Refills: 3 | OUTPATIENT
Start: 2025-02-05

## 2025-02-13 ENCOUNTER — MED REC SCAN ONLY (OUTPATIENT)
Dept: INTERNAL MEDICINE CLINIC | Facility: CLINIC | Age: 55
End: 2025-02-13

## 2025-02-25 ENCOUNTER — NURSE TRIAGE (OUTPATIENT)
Dept: INTERNAL MEDICINE CLINIC | Facility: CLINIC | Age: 55
End: 2025-02-25

## 2025-02-25 ENCOUNTER — OFFICE VISIT (OUTPATIENT)
Dept: INTERNAL MEDICINE CLINIC | Facility: CLINIC | Age: 55
End: 2025-02-25
Payer: COMMERCIAL

## 2025-02-25 VITALS
HEIGHT: 72.44 IN | DIASTOLIC BLOOD PRESSURE: 70 MMHG | WEIGHT: 174.19 LBS | RESPIRATION RATE: 16 BRPM | TEMPERATURE: 97 F | SYSTOLIC BLOOD PRESSURE: 102 MMHG | OXYGEN SATURATION: 98 % | HEART RATE: 74 BPM | BODY MASS INDEX: 23.34 KG/M2

## 2025-02-25 DIAGNOSIS — J06.9 VIRAL UPPER RESPIRATORY TRACT INFECTION: Primary | ICD-10-CM

## 2025-02-25 PROCEDURE — 3008F BODY MASS INDEX DOCD: CPT | Performed by: FAMILY MEDICINE

## 2025-02-25 PROCEDURE — 99213 OFFICE O/P EST LOW 20 MIN: CPT | Performed by: FAMILY MEDICINE

## 2025-02-25 PROCEDURE — G2211 COMPLEX E/M VISIT ADD ON: HCPCS | Performed by: FAMILY MEDICINE

## 2025-02-25 PROCEDURE — 3074F SYST BP LT 130 MM HG: CPT | Performed by: FAMILY MEDICINE

## 2025-02-25 PROCEDURE — 3078F DIAST BP <80 MM HG: CPT | Performed by: FAMILY MEDICINE

## 2025-02-25 RX ORDER — BENZONATATE 100 MG/1
CAPSULE ORAL
Qty: 30 CAPSULE | Refills: 0 | Status: SHIPPED | OUTPATIENT
Start: 2025-02-25

## 2025-02-25 NOTE — TELEPHONE ENCOUNTER
Action Requested: Summary for Provider     []  Critical Lab, Recommendations Needed  [x] Need Additional Advice  []   FYI    []   Need Orders  [] Need Medications Sent to Pharmacy  []  Other     SUMMARY: Patient with cold symptoms for 1 week including congestion and green mucus. Requesting an appointment, none available. Dr Otoole can you accommodate?     Reason for call: Cold  Onset: 1 Week     Patient called requesting an appointment with Dr. Otoole. Has cold symptoms for the past week including congestion and green mucus. Denies shortness of breath or fevers. Intermittent ear pain but none currently.               Reason for Disposition   Patient wants to be seen    Protocols used: Common Cold-A-OH

## 2025-02-25 NOTE — TELEPHONE ENCOUNTER
Future Appointments   Date Time Provider Department Center   2/25/2025 12:45 PM Evita Otoole,  [facility]

## 2025-02-26 NOTE — PROGRESS NOTES
Subjective:   Patient ID: Koby Atkins is a 54 year old male.    HPI Here with one week of cough, nasal congestion. No fever. No shortness of breath. Taking ibuprofen and sudafed.     History/Other:   Review of Systems   Constitutional:  Negative for chills and fever.   HENT:  Positive for rhinorrhea. Negative for ear pain.    Respiratory:  Positive for cough. Negative for chest tightness, shortness of breath and wheezing.    Cardiovascular:  Negative for chest pain.     Current Outpatient Medications   Medication Sig Dispense Refill    benzonatate 100 MG Oral Cap 1-2 caps PO TID PRN 30 capsule 0    PREVIDENT 5000 PLUS 1.1 % Dental Cream BRUSH TWICE DAILY AND EXPECTORATE      topiramate 25 MG Oral Tab Take 1 tablet (25 mg total) by mouth daily. 90 tablet 3    atorvastatin 40 MG Oral Tab Take 1 tablet (40 mg total) by mouth nightly. 90 tablet 3     Allergies:Allergies[1]    Objective:   Physical Exam  Vitals reviewed.   Constitutional:       Appearance: Normal appearance. He is well-developed.   HENT:      Head: Normocephalic and atraumatic.      Right Ear: Tympanic membrane, ear canal and external ear normal.      Left Ear: Tympanic membrane, ear canal and external ear normal.      Nose: Mucosal edema present.      Mouth/Throat:      Pharynx: No posterior oropharyngeal erythema.   Eyes:      Conjunctiva/sclera: Conjunctivae normal.   Cardiovascular:      Rate and Rhythm: Normal rate and regular rhythm.   Pulmonary:      Effort: Pulmonary effort is normal.      Breath sounds: Normal breath sounds.   Skin:     General: Skin is warm and dry.   Neurological:      Mental Status: He is alert.   Psychiatric:         Behavior: Behavior normal.         Assessment & Plan:   1. Viral upper respiratory tract infection    OTC meds for symptom relief as needed- Flonase and nasal saline spray.  Rx cough med. Discussed risks/benefits/potential side effects and proper use of medication.   Increase fluids and rest.   Discussed  precautions to prevent spread to others- no longer contagious likely.   Return if worsening and/or fever.      No orders of the defined types were placed in this encounter.      Meds This Visit:  Requested Prescriptions     Signed Prescriptions Disp Refills    benzonatate 100 MG Oral Cap 30 capsule 0     Si-2 caps PO TID PRN       Imaging & Referrals:  None         [1]   Allergies  Allergen Reactions    Amoxicillin OTHER (SEE COMMENTS)     Stomach problems    Sulfa Antibiotics

## 2025-02-27 RX ORDER — ATORVASTATIN CALCIUM 40 MG/1
40 TABLET, FILM COATED ORAL NIGHTLY
Qty: 90 TABLET | Refills: 3 | Status: SHIPPED | OUTPATIENT
Start: 2025-02-27

## 2025-02-27 NOTE — TELEPHONE ENCOUNTER
Refill passed per Clinic protocol.  Requested Prescriptions   Pending Prescriptions Disp Refills    ATORVASTATIN 40 MG Oral Tab [Pharmacy Med Name: ATORVASTATIN 40MG TABLETS] 90 tablet 3     Sig: TAKE 1 TABLET(40 MG) BY MOUTH EVERY NIGHT       Cholesterol Medication Protocol Passed - 2/27/2025  3:54 PM        Passed - ALT < 80     Lab Results   Component Value Date    ALT 26 09/28/2024             Passed - ALT resulted within past year        Passed - Lipid panel within past 12 months     Lab Results   Component Value Date    CHOLEST 118 09/28/2024    TRIG 54 09/28/2024    HDL 39 09/28/2024    LDL 54 01/14/2023    VLDL 6 01/14/2023    TCHDLRATIO 3.00 09/28/2024    NONHDLC 65 01/14/2023             Passed - In person appointment or virtual visit in the past 12 mos or appointment in next 3 mos     Recent Outpatient Visits              2 days ago Viral upper respiratory tract infection    [facility], [address] [place], [place] [address]Bernadette Anne, DO    Office Visit    1 month ago Annual physical exam    [facility] [address] [place], [place] [address]Bernadette Anne, DO    Office Visit    2 months ago Rotator cuff impingement syndrome of right shoulder    [facility] [address] [place], [place] [address]Miah Sincer K, PA    Office Visit    3 months ago Injury of right rotator cuff, initial encounter    [facility] [address] [place], [place] [address]Miah Sincer K, PA    Office Visit    4 months ago Strain of right shoulder, initial encounter    [facility] [address] [place], [place] [address]Bernadette Anne, DO    Office Visit                      Passed - Medication is active on med list

## 2025-05-13 RX ORDER — TOPIRAMATE 25 MG/1
25 TABLET, FILM COATED ORAL DAILY
Qty: 90 TABLET | Refills: 3 | Status: SHIPPED | OUTPATIENT
Start: 2025-05-13

## 2025-05-13 NOTE — TELEPHONE ENCOUNTER
Refill passes per [facility] protocol.    No future appointments with primary care medicine.

## (undated) DEVICE — STERILE POLYISOPRENE POWDER-FREE SURGICAL GLOVES WITH EMOLLIENT COATING: Brand: PROTEXIS

## (undated) DEVICE — SLEEVE KENDALL SCD EXPRESS MED

## (undated) DEVICE — DERMABOND CLOSURE 0.7ML TOPICL

## (undated) DEVICE — SCRUB PVP -1 PREP SOLUTION 4OZ

## (undated) DEVICE — STERILE POLYISOPRENE POWDER-FREE SURGICAL GLOVES: Brand: PROTEXIS

## (undated) DEVICE — SOL NACL IRRIG 0.9% 1000ML BTL

## (undated) DEVICE — RECTAL CDS-LF: Brand: MEDLINE INDUSTRIES, INC.

## (undated) DEVICE — UNDYED BRAIDED (POLYGLACTIN 910), SYNTHETIC ABSORBABLE SUTURE: Brand: COATED VICRYL

## (undated) DEVICE — #11 STERILE BLADE: Brand: POLYMER COATED BLADES

## (undated) NOTE — Clinical Note
05/05/2017        [facility]      Dear Sylvia Hoffman records indicate that you have outstanding lab work and or testing that was ordered for you and has not yet been completed:      Lipid Panel  Comp Metabolic

## (undated) NOTE — LETTER
02/17/21        [address] [place] [id number]      Dear Joseluis Mcduffieutant,    [facility] records indicate that you have outstanding lab work and or testing that was ordered for you and has not yet been completed:  Orders Placed This Encounter

## (undated) NOTE — LETTER
Patient Name: Lazarus Loader  YOB: 1970          MRN number:  [id number]  Date:  7/21/2021  Referring Physician:  Rufina Vargas      Dear Dr. Latisha Blank,    Progress Summary  Pt has attended 9 visits in Physical Therapy.    Dx:  L shoulder a increase shoulder AROM ER to be able to reach and fasten seatbelt (NOT MET)  · Pt will increase shoulder AROM IR to be able to reach in back pocket, tuck in shirt, and turn steering wheel without pain (NOT MET)  · Pt will improve shoulder strength througho